# Patient Record
Sex: MALE | Race: WHITE | NOT HISPANIC OR LATINO | Employment: OTHER | URBAN - METROPOLITAN AREA
[De-identification: names, ages, dates, MRNs, and addresses within clinical notes are randomized per-mention and may not be internally consistent; named-entity substitution may affect disease eponyms.]

---

## 2023-02-03 PROBLEM — E78.00 PURE HYPERCHOLESTEROLEMIA: Status: ACTIVE | Noted: 2023-02-03

## 2023-02-03 PROBLEM — E53.8 VITAMIN B12 DEFICIENCY: Status: ACTIVE | Noted: 2023-02-03

## 2023-02-03 PROBLEM — Z12.11 ENCOUNTER FOR SCREENING COLONOSCOPY: Status: ACTIVE | Noted: 2023-02-03

## 2023-02-03 PROBLEM — R19.5 POSITIVE OCCULT STOOL BLOOD TEST: Status: ACTIVE | Noted: 2023-02-03

## 2023-02-03 PROBLEM — G56.22 ULNAR TUNNEL SYNDROME OF LEFT WRIST: Status: ACTIVE | Noted: 2023-02-03

## 2023-02-03 PROBLEM — K76.0 FATTY LIVER DISEASE, NONALCOHOLIC: Status: ACTIVE | Noted: 2023-02-03

## 2023-02-03 PROBLEM — E87.5 HYPERKALEMIA: Status: ACTIVE | Noted: 2023-02-03

## 2023-02-03 PROBLEM — N18.30 TYPE 2 DIABETES MELLITUS WITH STAGE 3 CHRONIC KIDNEY DISEASE, WITH LONG-TERM CURRENT USE OF INSULIN (MULTI): Status: ACTIVE | Noted: 2023-02-03

## 2023-02-03 PROBLEM — I10 HTN (HYPERTENSION), BENIGN: Status: RESOLVED | Noted: 2023-02-03 | Resolved: 2023-02-03

## 2023-02-03 PROBLEM — D64.9 ANEMIA: Status: ACTIVE | Noted: 2023-02-03

## 2023-02-03 PROBLEM — E87.1 HYPONATREMIA: Status: ACTIVE | Noted: 2023-02-03

## 2023-02-03 PROBLEM — E11.22 TYPE 2 DIABETES MELLITUS WITH STAGE 3 CHRONIC KIDNEY DISEASE, WITH LONG-TERM CURRENT USE OF INSULIN (MULTI): Status: ACTIVE | Noted: 2023-02-03

## 2023-02-03 PROBLEM — M54.42 CHRONIC MIDLINE LOW BACK PAIN WITH BILATERAL SCIATICA: Status: ACTIVE | Noted: 2023-02-03

## 2023-02-03 PROBLEM — R53.82 CHRONIC FATIGUE: Status: ACTIVE | Noted: 2023-02-03

## 2023-02-03 PROBLEM — Z79.4 TYPE 2 DIABETES MELLITUS WITH STAGE 3 CHRONIC KIDNEY DISEASE, WITH LONG-TERM CURRENT USE OF INSULIN (MULTI): Status: ACTIVE | Noted: 2023-02-03

## 2023-02-03 PROBLEM — L29.9 ITCH OF SKIN: Status: ACTIVE | Noted: 2023-02-03

## 2023-02-03 PROBLEM — M54.41 CHRONIC MIDLINE LOW BACK PAIN WITH BILATERAL SCIATICA: Status: ACTIVE | Noted: 2023-02-03

## 2023-02-03 PROBLEM — G89.29 CHRONIC NECK PAIN: Status: ACTIVE | Noted: 2023-02-03

## 2023-02-03 PROBLEM — G89.29 CHRONIC MIDLINE LOW BACK PAIN WITH BILATERAL SCIATICA: Status: ACTIVE | Noted: 2023-02-03

## 2023-02-03 PROBLEM — Z23 ENCOUNTER FOR IMMUNIZATION: Status: ACTIVE | Noted: 2023-02-03

## 2023-02-03 PROBLEM — Z12.11 SCREENING FOR COLON CANCER: Status: ACTIVE | Noted: 2023-02-03

## 2023-02-03 PROBLEM — J06.9 URI WITH COUGH AND CONGESTION: Status: ACTIVE | Noted: 2023-02-03

## 2023-02-03 PROBLEM — M54.2 CHRONIC NECK PAIN: Status: ACTIVE | Noted: 2023-02-03

## 2023-02-03 PROBLEM — F17.210 NICOTINE DEPENDENCE, CIGARETTES, UNCOMPLICATED: Status: ACTIVE | Noted: 2023-02-03

## 2023-02-03 PROBLEM — R09.81 CONGESTION OF NASAL SINUS: Status: ACTIVE | Noted: 2023-02-03

## 2023-02-03 PROBLEM — M79.18 MYOFASCIAL PAIN: Status: ACTIVE | Noted: 2023-02-03

## 2023-02-03 PROBLEM — F11.20 OPIOID DEPENDENCE (MULTI): Status: ACTIVE | Noted: 2023-02-03

## 2023-02-03 PROBLEM — R10.11 RIGHT UPPER QUADRANT ABDOMINAL PAIN: Status: ACTIVE | Noted: 2023-02-03

## 2023-02-03 RX ORDER — CARVEDILOL 12.5 MG/1
1 TABLET ORAL 2 TIMES DAILY
COMMUNITY
End: 2024-02-06 | Stop reason: SDUPTHER

## 2023-02-03 RX ORDER — TRIAMCINOLONE ACETONIDE 1 MG/G
OINTMENT TOPICAL
COMMUNITY
Start: 2019-04-30 | End: 2023-11-06 | Stop reason: SDUPTHER

## 2023-02-03 RX ORDER — NALOXONE HYDROCHLORIDE 4 MG/.1ML
4 SPRAY NASAL
COMMUNITY
Start: 2019-05-17

## 2023-02-03 RX ORDER — GLIMEPIRIDE 4 MG/1
1 TABLET ORAL 2 TIMES DAILY
COMMUNITY
Start: 2019-05-17 | End: 2023-11-06 | Stop reason: SDUPTHER

## 2023-02-03 RX ORDER — ATORVASTATIN CALCIUM 80 MG/1
1 TABLET, FILM COATED ORAL DAILY
COMMUNITY
Start: 2019-07-05 | End: 2024-04-16 | Stop reason: SDUPTHER

## 2023-02-03 RX ORDER — LANCETS
1 EACH MISCELLANEOUS 3 TIMES DAILY
COMMUNITY

## 2023-02-03 RX ORDER — BUPRENORPHINE AND NALOXONE 8; 2 MG/1; MG/1
1.5 FILM, SOLUBLE BUCCAL; SUBLINGUAL DAILY
COMMUNITY
Start: 2019-05-16

## 2023-02-03 RX ORDER — INSULIN GLARGINE 100 [IU]/ML
40 INJECTION, SOLUTION SUBCUTANEOUS
COMMUNITY
Start: 2020-06-17 | End: 2023-04-24 | Stop reason: SDUPTHER

## 2023-02-03 RX ORDER — EZETIMIBE 10 MG/1
1 TABLET ORAL DAILY
COMMUNITY
Start: 2022-04-25 | End: 2024-02-06 | Stop reason: SDUPTHER

## 2023-02-03 RX ORDER — BLOOD SUGAR DIAGNOSTIC
1 STRIP MISCELLANEOUS
COMMUNITY
End: 2023-04-04 | Stop reason: SDUPTHER

## 2023-02-03 RX ORDER — BLOOD SUGAR DIAGNOSTIC
1 STRIP MISCELLANEOUS 3 TIMES DAILY
COMMUNITY
Start: 2022-04-11 | End: 2023-11-06 | Stop reason: SDUPTHER

## 2023-02-03 RX ORDER — LISINOPRIL 20 MG/1
TABLET ORAL
COMMUNITY
Start: 2021-03-18 | End: 2023-04-04 | Stop reason: SDUPTHER

## 2023-03-30 LAB
ALANINE AMINOTRANSFERASE (SGPT) (U/L) IN SER/PLAS: 16 U/L (ref 10–52)
ALBUMIN (G/DL) IN SER/PLAS: 4 G/DL (ref 3.4–5)
ALKALINE PHOSPHATASE (U/L) IN SER/PLAS: 143 U/L (ref 33–120)
ALPHA-1 FETOPROTEIN (NG/ML) IN SER/PLAS: <4 NG/ML (ref 0–9)
ANION GAP IN SER/PLAS: 12 MMOL/L (ref 10–20)
ASPARTATE AMINOTRANSFERASE (SGOT) (U/L) IN SER/PLAS: 17 U/L (ref 9–39)
BILIRUBIN TOTAL (MG/DL) IN SER/PLAS: 0.5 MG/DL (ref 0–1.2)
CALCIUM (MG/DL) IN SER/PLAS: 8.7 MG/DL (ref 8.6–10.3)
CARBON DIOXIDE, TOTAL (MMOL/L) IN SER/PLAS: 27 MMOL/L (ref 21–32)
CHLORIDE (MMOL/L) IN SER/PLAS: 102 MMOL/L (ref 98–107)
CHOLESTEROL (MG/DL) IN SER/PLAS: 83 MG/DL (ref 0–199)
CHOLESTEROL IN HDL (MG/DL) IN SER/PLAS: 23.2 MG/DL
CHOLESTEROL/HDL RATIO: 3.6
COBALAMIN (VITAMIN B12) (PG/ML) IN SER/PLAS: 428 PG/ML (ref 211–911)
CREATININE (MG/DL) IN SER/PLAS: 2.03 MG/DL (ref 0.5–1.3)
ERYTHROCYTE DISTRIBUTION WIDTH (RATIO) BY AUTOMATED COUNT: 12.8 % (ref 11.5–14.5)
ERYTHROCYTE MEAN CORPUSCULAR HEMOGLOBIN CONCENTRATION (G/DL) BY AUTOMATED: 31.6 G/DL (ref 32–36)
ERYTHROCYTE MEAN CORPUSCULAR VOLUME (FL) BY AUTOMATED COUNT: 92 FL (ref 80–100)
ERYTHROCYTES (10*6/UL) IN BLOOD BY AUTOMATED COUNT: 4.15 X10E12/L (ref 4.5–5.9)
ESTIMATED AVERAGE GLUCOSE FOR HBA1C: 275 MG/DL
GFR MALE: 37 ML/MIN/1.73M2
GLUCOSE (MG/DL) IN SER/PLAS: 144 MG/DL (ref 74–99)
HEMATOCRIT (%) IN BLOOD BY AUTOMATED COUNT: 38.3 % (ref 41–52)
HEMOGLOBIN (G/DL) IN BLOOD: 12.1 G/DL (ref 13.5–17.5)
HEMOGLOBIN A1C/HEMOGLOBIN TOTAL IN BLOOD: 11.2 %
LDL: 31 MG/DL (ref 0–99)
LEUKOCYTES (10*3/UL) IN BLOOD BY AUTOMATED COUNT: 9.2 X10E9/L (ref 4.4–11.3)
PLATELETS (10*3/UL) IN BLOOD AUTOMATED COUNT: 244 X10E9/L (ref 150–450)
POTASSIUM (MMOL/L) IN SER/PLAS: 4.7 MMOL/L (ref 3.5–5.3)
PROTEIN TOTAL: 7.1 G/DL (ref 6.4–8.2)
SODIUM (MMOL/L) IN SER/PLAS: 136 MMOL/L (ref 136–145)
THYROTROPIN (MIU/L) IN SER/PLAS BY DETECTION LIMIT <= 0.05 MIU/L: 1.74 MIU/L (ref 0.44–3.98)
TRIGLYCERIDE (MG/DL) IN SER/PLAS: 144 MG/DL (ref 0–149)
UREA NITROGEN (MG/DL) IN SER/PLAS: 37 MG/DL (ref 6–23)
VLDL: 29 MG/DL (ref 0–40)

## 2023-04-03 PROBLEM — E11.21: Status: ACTIVE | Noted: 2023-04-03

## 2023-04-03 RX ORDER — PEN NEEDLE, DIABETIC 30 GX3/16"
NEEDLE, DISPOSABLE MISCELLANEOUS
COMMUNITY
End: 2024-02-06 | Stop reason: SDUPTHER

## 2023-04-04 ENCOUNTER — OFFICE VISIT (OUTPATIENT)
Dept: PRIMARY CARE | Facility: CLINIC | Age: 58
End: 2023-04-04
Payer: COMMERCIAL

## 2023-04-04 VITALS — WEIGHT: 241 LBS | HEIGHT: 73 IN | BODY MASS INDEX: 31.94 KG/M2

## 2023-04-04 DIAGNOSIS — Z79.4 TYPE 2 DIABETES MELLITUS WITH DIABETIC NEPHROPATHY, WITH LONG-TERM CURRENT USE OF INSULIN (MULTI): ICD-10-CM

## 2023-04-04 DIAGNOSIS — F17.210 NICOTINE DEPENDENCE, CIGARETTES, UNCOMPLICATED: ICD-10-CM

## 2023-04-04 DIAGNOSIS — F11.20 UNCOMPLICATED OPIOID DEPENDENCE (MULTI): ICD-10-CM

## 2023-04-04 DIAGNOSIS — E78.00 PURE HYPERCHOLESTEROLEMIA: ICD-10-CM

## 2023-04-04 DIAGNOSIS — E11.21 TYPE 2 DIABETES MELLITUS WITH DIABETIC NEPHROPATHY, WITH LONG-TERM CURRENT USE OF INSULIN (MULTI): ICD-10-CM

## 2023-04-04 DIAGNOSIS — Z12.11 COLON CANCER SCREENING: ICD-10-CM

## 2023-04-04 DIAGNOSIS — K76.0 FATTY LIVER DISEASE, NONALCOHOLIC: ICD-10-CM

## 2023-04-04 DIAGNOSIS — Z12.2 ENCOUNTER FOR SCREENING FOR LUNG CANCER: ICD-10-CM

## 2023-04-04 DIAGNOSIS — E11.22 TYPE 2 DIABETES MELLITUS WITH STAGE 3B CHRONIC KIDNEY DISEASE, WITH LONG-TERM CURRENT USE OF INSULIN (MULTI): Primary | ICD-10-CM

## 2023-04-04 DIAGNOSIS — N18.32 TYPE 2 DIABETES MELLITUS WITH STAGE 3B CHRONIC KIDNEY DISEASE, WITH LONG-TERM CURRENT USE OF INSULIN (MULTI): Primary | ICD-10-CM

## 2023-04-04 DIAGNOSIS — Z87.891 PERSONAL HISTORY OF NICOTINE DEPENDENCE: ICD-10-CM

## 2023-04-04 DIAGNOSIS — I10 PRIMARY HYPERTENSION: ICD-10-CM

## 2023-04-04 DIAGNOSIS — Z79.4 TYPE 2 DIABETES MELLITUS WITH STAGE 3B CHRONIC KIDNEY DISEASE, WITH LONG-TERM CURRENT USE OF INSULIN (MULTI): Primary | ICD-10-CM

## 2023-04-04 PROBLEM — M54.42 CHRONIC MIDLINE LOW BACK PAIN WITH BILATERAL SCIATICA: Status: RESOLVED | Noted: 2023-02-03 | Resolved: 2023-04-04

## 2023-04-04 PROBLEM — R10.11 RIGHT UPPER QUADRANT ABDOMINAL PAIN: Status: RESOLVED | Noted: 2023-02-03 | Resolved: 2023-04-04

## 2023-04-04 PROBLEM — R09.81 CONGESTION OF NASAL SINUS: Status: RESOLVED | Noted: 2023-02-03 | Resolved: 2023-04-04

## 2023-04-04 PROBLEM — J06.9 URI WITH COUGH AND CONGESTION: Status: RESOLVED | Noted: 2023-02-03 | Resolved: 2023-04-04

## 2023-04-04 PROBLEM — G89.29 CHRONIC MIDLINE LOW BACK PAIN WITH BILATERAL SCIATICA: Status: RESOLVED | Noted: 2023-02-03 | Resolved: 2023-04-04

## 2023-04-04 PROBLEM — M54.41 CHRONIC MIDLINE LOW BACK PAIN WITH BILATERAL SCIATICA: Status: RESOLVED | Noted: 2023-02-03 | Resolved: 2023-04-04

## 2023-04-04 PROCEDURE — 4010F ACE/ARB THERAPY RXD/TAKEN: CPT | Performed by: FAMILY MEDICINE

## 2023-04-04 PROCEDURE — 3066F NEPHROPATHY DOC TX: CPT | Performed by: FAMILY MEDICINE

## 2023-04-04 PROCEDURE — 99214 OFFICE O/P EST MOD 30 MIN: CPT | Performed by: FAMILY MEDICINE

## 2023-04-04 PROCEDURE — 99406 BEHAV CHNG SMOKING 3-10 MIN: CPT | Performed by: FAMILY MEDICINE

## 2023-04-04 PROCEDURE — 3046F HEMOGLOBIN A1C LEVEL >9.0%: CPT | Performed by: FAMILY MEDICINE

## 2023-04-04 RX ORDER — DULAGLUTIDE 0.75 MG/.5ML
0.75 INJECTION, SOLUTION SUBCUTANEOUS
Qty: 4 EACH | Refills: 11 | Status: SHIPPED | OUTPATIENT
Start: 2023-04-04 | End: 2023-11-06 | Stop reason: ALTCHOICE

## 2023-04-04 RX ORDER — LISINOPRIL 20 MG/1
20 TABLET ORAL DAILY
Qty: 90 TABLET | Refills: 3 | Status: SHIPPED | OUTPATIENT
Start: 2023-04-04 | End: 2024-04-16 | Stop reason: SDUPTHER

## 2023-04-04 RX ORDER — BLOOD SUGAR DIAGNOSTIC
1 STRIP MISCELLANEOUS
Qty: 100 EACH | Refills: 3 | Status: SHIPPED | OUTPATIENT
Start: 2023-04-04

## 2023-04-04 ASSESSMENT — PATIENT HEALTH QUESTIONNAIRE - PHQ9
2. FEELING DOWN, DEPRESSED OR HOPELESS: NOT AT ALL
1. LITTLE INTEREST OR PLEASURE IN DOING THINGS: NOT AT ALL
SUM OF ALL RESPONSES TO PHQ9 QUESTIONS 1 AND 2: 0

## 2023-04-04 NOTE — ASSESSMENT & PLAN NOTE
Add trulicty. Increase lantus to 55 units at bedtime. Call if FBG is below 80. Pt declined to see an endocrinologist for 2nd opinion.   
Fu with nephrology  
Hyperlipidemia on statin. No GI upset or muscle ache. [facility].  Health diet and regular exercise. Decrease calorie intake to lose wt.  f/u in 3 mos.     
Nicotine dependence counseling: patient  smokes cigarettes.  I discussed with patient that  tobacco addiction increases the risks of COPD (emphysema), heart attack, stroke, Peripheral artery disease, and cancer etc. Treatment options such as behavioral counseling (specialty clinic, smoking cessation program, 1-800-QUIT-NOW) and medications (Zyban, chantix and Nicotine replacement therapy) were  discussed with the patient who is considering to quit. Nicotine withdrawal symptoms such as  increased appetite and weight gain, changes in mood (dysphoria or depression), insomnia, irritability, anxiety, difficulty concentrating and restlessness were discussed with patient. Inform patient that Nicotine withdrawal symptoms peak in the first three days of quitting and subside over three to four weeks. More than 3 minutes' discussion and counseling.    
Non alcoholic fatty liver, recommend HepB and A vaccines. no eoth or otc meds. low fat and low cholesterol intake. regular exercise. decrease calorie intake to lose weight. limit fructose intake. f/u in 3 mos    
Pt denies substance abuse. Fu with addiction specialist  
74.8

## 2023-04-04 NOTE — PROGRESS NOTES
"Subjective   Patient ID: Parth Rodríguez is a 57 y.o. male who presents for Follow-up.    HPI   Patient has been compliant with taking all  current medications. No hypoglycemic episodes. No polydipsia, polyuria or significant weight changes. No lower extremities skin lesion. Stable  BLE numbness and  tingling. No blurry vision. No GI upset  or muscle ache while on statin and zetia for high lipids. No jaundice. Normal appetite. No CP, HA, dizziness, heart palpitation. No claudication or cold LE.  Stable  LE edema. No imbalance or falls. Good mood.     Review of Systems    Objective   Ht 1.854 m (6' 1\")   Wt 109 kg (241 lb)   BMI 31.80 kg/m²     Physical Exam  NAD, well groomed, No sclera icterus. neck: supple, no cervical or axillary lymphadenopathy,  lungs: CTA b/l, heart: RRR, No LE edema, normal pedal pulses, abd: soft, no tenderness, no organomegaly, BS+, decreased sensation  at bilateral feet. No skin lesions at bilateral feet.  Good balance. CNII-XII were grossly intact, good judgment and memory. No depressed mood.    Assessment/Plan   Problem List Items Addressed This Visit          Digestive    Fatty liver disease, nonalcoholic     Non alcoholic fatty liver, recommend HepB and A vaccines. no eoth or otc meds. low fat and low cholesterol intake. regular exercise. decrease calorie intake to lose weight. limit fructose intake. f/u in 3 mos              Endocrine/Metabolic    Type 2 diabetes mellitus with stage 3 chronic kidney disease, with long-term current use of insulin (CMS/HCC) - Primary     Add trulicty. Increase lantus to 55 units at bedtime. Call if FBG is below 80. Pt declined to see an endocrinologist for 2nd opinion.          Relevant Medications    lisinopril 20 mg tablet    pen needle, diabetic 32 gauge x 1/4\" needle    Diabetes mellitus with diabetic nephropathy (CMS/HCC)     Fu with nephrology         Relevant Medications    dulaglutide (Trulicity) 0.75 mg/0.5 mL pen injector       Other    " Nicotine dependence, cigarettes, uncomplicated     Nicotine dependence counseling: patient  smokes cigarettes.  I discussed with patient that  tobacco addiction increases the risks of COPD (emphysema), heart attack, stroke, Peripheral artery disease, and cancer etc. Treatment options such as behavioral counseling (specialty clinic, smoking cessation program, 1-800-QUIT-NOW) and medications (Zyban, chantix and Nicotine replacement therapy) were  discussed with the patient who is considering to quit. Nicotine withdrawal symptoms such as  increased appetite and weight gain, changes in mood (dysphoria or depression), insomnia, irritability, anxiety, difficulty concentrating and restlessness were discussed with patient. Inform patient that Nicotine withdrawal symptoms peak in the first three days of quitting and subside over three to four weeks. More than 3 minutes' discussion and counseling.           Opioid dependence (CMS/HCC)     Pt denies substance abuse. Fu with addiction specialist         Pure hypercholesterolemia     Hyperlipidemia on statin. No GI upset or muscle ache. [facility] labs for evaluation.  Health diet and regular exercise. Decrease calorie intake to lose wt.  f/u in 3 mos.             Other Visit Diagnoses       Primary hypertension        Encounter for screening for lung cancer        Relevant Orders    CT lung screening low dose    Colon cancer screening        Relevant Orders    Referral to Colorectal Surgery    Personal history of nicotine dependence        Relevant Orders    CT lung screening low dose

## 2023-04-24 ENCOUNTER — TELEPHONE (OUTPATIENT)
Dept: PRIMARY CARE | Facility: CLINIC | Age: 58
End: 2023-04-24
Payer: COMMERCIAL

## 2023-04-24 DIAGNOSIS — Z79.4 TYPE 2 DIABETES MELLITUS WITH STAGE 3A CHRONIC KIDNEY DISEASE, WITH LONG-TERM CURRENT USE OF INSULIN (MULTI): Primary | ICD-10-CM

## 2023-04-24 DIAGNOSIS — N18.31 TYPE 2 DIABETES MELLITUS WITH STAGE 3A CHRONIC KIDNEY DISEASE, WITH LONG-TERM CURRENT USE OF INSULIN (MULTI): Primary | ICD-10-CM

## 2023-04-24 DIAGNOSIS — E11.22 TYPE 2 DIABETES MELLITUS WITH STAGE 3A CHRONIC KIDNEY DISEASE, WITH LONG-TERM CURRENT USE OF INSULIN (MULTI): Primary | ICD-10-CM

## 2023-04-24 RX ORDER — INSULIN GLARGINE 100 [IU]/ML
55 INJECTION, SOLUTION SUBCUTANEOUS
Qty: 45 ML | Refills: 0 | Status: SHIPPED | OUTPATIENT
Start: 2023-04-24 | End: 2023-11-06 | Stop reason: SDUPTHER

## 2023-05-24 ENCOUNTER — TELEPHONE (OUTPATIENT)
Dept: PRIMARY CARE | Facility: CLINIC | Age: 58
End: 2023-05-24
Payer: COMMERCIAL

## 2023-05-24 NOTE — TELEPHONE ENCOUNTER
Shi with [facility] called and asked that you send in  the following refills for Pt:    insulin glargine (Lantus Solostar U-100 Insulin) 100 unit/mL (3 mL) pen   lisinopril 20 mg tablet   glimepiride (Amaryl) 4 mg tablet   lancets (OneTouch UltraSoft Lancets) misc   ezetimibe (Zetia) 10 mg tablet   atorvastatin (Lipitor) 80 mg tablet       Pharm phone # [phone]

## 2023-07-18 ENCOUNTER — APPOINTMENT (OUTPATIENT)
Dept: PRIMARY CARE | Facility: CLINIC | Age: 58
End: 2023-07-18
Payer: COMMERCIAL

## 2023-11-01 ENCOUNTER — TELEPHONE (OUTPATIENT)
Dept: PRIMARY CARE | Facility: CLINIC | Age: 58
End: 2023-11-01
Payer: COMMERCIAL

## 2023-11-01 NOTE — TELEPHONE ENCOUNTER
Refills on Lantus Solostar 55 units, Glimiperide 10 mg 2 x daily sent to [facility] on [address] in [place]. Rafael has an appt Monday when he gets back in to town but is out of these now and his sugars were 613 today

## 2023-11-06 ENCOUNTER — OFFICE VISIT (OUTPATIENT)
Dept: PRIMARY CARE | Facility: CLINIC | Age: 58
End: 2023-11-06
Payer: COMMERCIAL

## 2023-11-06 VITALS — DIASTOLIC BLOOD PRESSURE: 81 MMHG | SYSTOLIC BLOOD PRESSURE: 129 MMHG | RESPIRATION RATE: 14 BRPM | HEART RATE: 76 BPM

## 2023-11-06 DIAGNOSIS — R21 RASH OF GROIN: ICD-10-CM

## 2023-11-06 DIAGNOSIS — E78.00 PURE HYPERCHOLESTEROLEMIA: ICD-10-CM

## 2023-11-06 DIAGNOSIS — Z12.11 COLON CANCER SCREENING: ICD-10-CM

## 2023-11-06 DIAGNOSIS — F17.210 NICOTINE DEPENDENCE, CIGARETTES, UNCOMPLICATED: ICD-10-CM

## 2023-11-06 DIAGNOSIS — N18.32 TYPE 2 DIABETES MELLITUS WITH STAGE 3B CHRONIC KIDNEY DISEASE, WITH LONG-TERM CURRENT USE OF INSULIN (MULTI): Primary | ICD-10-CM

## 2023-11-06 DIAGNOSIS — Z79.4 TYPE 2 DIABETES MELLITUS WITH STAGE 3B CHRONIC KIDNEY DISEASE, WITH LONG-TERM CURRENT USE OF INSULIN (MULTI): Primary | ICD-10-CM

## 2023-11-06 DIAGNOSIS — E11.22 TYPE 2 DIABETES MELLITUS WITH STAGE 3B CHRONIC KIDNEY DISEASE, WITH LONG-TERM CURRENT USE OF INSULIN (MULTI): Primary | ICD-10-CM

## 2023-11-06 DIAGNOSIS — N25.81 HYPERPARATHYROIDISM DUE TO RENAL INSUFFICIENCY (MULTI): ICD-10-CM

## 2023-11-06 PROBLEM — N18.30 TYPE 2 DIABETES MELLITUS WITH STAGE 3 CHRONIC KIDNEY DISEASE, WITH LONG-TERM CURRENT USE OF INSULIN (MULTI): Status: RESOLVED | Noted: 2023-02-03 | Resolved: 2023-11-06

## 2023-11-06 LAB — POC FINGERSTICK BLOOD GLUCOSE: 280 MG/DL (ref 70–100)

## 2023-11-06 PROCEDURE — 3046F HEMOGLOBIN A1C LEVEL >9.0%: CPT | Performed by: FAMILY MEDICINE

## 2023-11-06 PROCEDURE — 90686 IIV4 VACC NO PRSV 0.5 ML IM: CPT | Performed by: FAMILY MEDICINE

## 2023-11-06 PROCEDURE — 99406 BEHAV CHNG SMOKING 3-10 MIN: CPT | Performed by: FAMILY MEDICINE

## 2023-11-06 PROCEDURE — 4010F ACE/ARB THERAPY RXD/TAKEN: CPT | Performed by: FAMILY MEDICINE

## 2023-11-06 PROCEDURE — 3066F NEPHROPATHY DOC TX: CPT | Performed by: FAMILY MEDICINE

## 2023-11-06 PROCEDURE — 3079F DIAST BP 80-89 MM HG: CPT | Performed by: FAMILY MEDICINE

## 2023-11-06 PROCEDURE — 99214 OFFICE O/P EST MOD 30 MIN: CPT | Performed by: FAMILY MEDICINE

## 2023-11-06 PROCEDURE — 82962 GLUCOSE BLOOD TEST: CPT | Performed by: FAMILY MEDICINE

## 2023-11-06 PROCEDURE — G0008 ADMIN INFLUENZA VIRUS VAC: HCPCS | Performed by: FAMILY MEDICINE

## 2023-11-06 PROCEDURE — 3074F SYST BP LT 130 MM HG: CPT | Performed by: FAMILY MEDICINE

## 2023-11-06 RX ORDER — TRIAMCINOLONE ACETONIDE 1 MG/G
OINTMENT TOPICAL 2 TIMES DAILY
Qty: 20 G | Refills: 2 | Status: SHIPPED | OUTPATIENT
Start: 2023-11-06

## 2023-11-06 RX ORDER — INSULIN GLARGINE 100 [IU]/ML
35 INJECTION, SOLUTION SUBCUTANEOUS
Qty: 45 ML | Refills: 0 | Status: SHIPPED | OUTPATIENT
Start: 2023-11-06 | End: 2023-11-06 | Stop reason: SDUPTHER

## 2023-11-06 RX ORDER — BLOOD SUGAR DIAGNOSTIC
1 STRIP MISCELLANEOUS 4 TIMES DAILY
Qty: 400 STRIP | Refills: 3 | Status: SHIPPED | OUTPATIENT
Start: 2023-11-06

## 2023-11-06 RX ORDER — GLIMEPIRIDE 4 MG/1
4 TABLET ORAL 2 TIMES DAILY
Qty: 180 TABLET | Refills: 3 | Status: SHIPPED | OUTPATIENT
Start: 2023-11-06

## 2023-11-06 RX ORDER — INSULIN GLARGINE 100 [IU]/ML
35 INJECTION, SOLUTION SUBCUTANEOUS
Qty: 45 ML | Refills: 0 | Status: SHIPPED | OUTPATIENT
Start: 2023-11-06 | End: 2024-02-06 | Stop reason: SDUPTHER

## 2023-11-06 NOTE — ASSESSMENT & PLAN NOTE
Hyperlipidemia on zetia and statin. No GI upset or muscle ache. check labs for evaluation.  Health diet and regular exercise. Decrease calorie intake to lose wt.  f/u in 3 mos.

## 2023-11-06 NOTE — ASSESSMENT & PLAN NOTE
DMII, not controlled. Be compliant with taking  current medications and appt. Check A1C, urine albumin. advise eye exam by an OD yearly and checking bilateral feet for skin lesions qhs. Healthy diet and regular exercise. Decrease calorie intake to lose wt.

## 2023-11-06 NOTE — PROGRESS NOTES
Subjective   Patient ID: Parth Rodríguez is a 58 y.o. male who presents for fu    HPI   Patient has not been compliant with appt or taking all  current medications. FBG has been at 180's most days. Random BG was at 600 lately. Pt did not go to ER as recommended. No hypoglycemic episodes. No polydipsia, polyuria or significant weight changes. stable LE numbness and tingling. No blurry vision. No GI upset  or muscle ache while on zetia and statin for high lipids. Normal appetite. No sob, cough, CP, HA, dizziness, heart palpitation. No claudication or cold LE.  Mild  LE edema. No  falls. Good mood. No confusion. Rashes at groin area was controlled with triamcinolone cream prn. Pt has not had derm eval yet.    Review of Systems    Objective   /81   Pulse 76   Resp 14     Physical Exam  NAD, well groomed, No sclera icterus. neck: supple, no cervical or axillary lymphadenopathy,  lungs: CTA b/l, heart: RRR, mild LE edema, normal pedal pulses, abd: soft, no tenderness, BS+, normal strength but decreased  sensation  at bilateral lower extremities. No skin lesions at bilateral feet.   No rashes at groin area. Good balance. CNII-XII were grossly intact, good judgment and memory. No depressed mood.    Assessment/Plan        Patient is not sick today. Patient is not anxious about getting a shot today. Patient has never had Guillain Barré syndrome. Patient has never felt dizzy or faint before, during, or after a shot. Patient has never had a serious reaction to influenza vaccine in the past.  Patient has never had an allergy to an ingredient of influenza vaccine.  Flu shot was given via IM today.   Nicotine dependence counseling: patient  smokes cigarettes.  I discussed with patient that  tobacco addiction increases the risks of COPD (emphysema), heart attack, stroke, Peripheral artery disease, and cancer etc. Treatment options such as behavioral counseling (specialty clinic, smoking cessation program, 1-800-QUIT-NOW)  and medications (Zyban, chantix and Nicotine replacement therapy) were  discussed with the patient who is considering to quit. Nicotine withdrawal symptoms such as  increased appetite and weight gain, changes in mood (dysphoria or depression), insomnia, irritability, anxiety, difficulty concentrating and restlessness were discussed with patient. Inform patient that Nicotine withdrawal symptoms peak in the first three days of quitting and subside over three to four weeks. More than 3 minutes' discussion and counseling.

## 2024-01-10 ENCOUNTER — LAB (OUTPATIENT)
Dept: LAB | Facility: LAB | Age: 59
End: 2024-01-10
Payer: COMMERCIAL

## 2024-01-10 DIAGNOSIS — N18.32 TYPE 2 DIABETES MELLITUS WITH STAGE 3B CHRONIC KIDNEY DISEASE, WITH LONG-TERM CURRENT USE OF INSULIN (MULTI): ICD-10-CM

## 2024-01-10 DIAGNOSIS — N25.81 HYPERPARATHYROIDISM DUE TO RENAL INSUFFICIENCY (MULTI): ICD-10-CM

## 2024-01-10 DIAGNOSIS — E11.22 TYPE 2 DIABETES MELLITUS WITH STAGE 3B CHRONIC KIDNEY DISEASE, WITH LONG-TERM CURRENT USE OF INSULIN (MULTI): ICD-10-CM

## 2024-01-10 DIAGNOSIS — Z79.4 TYPE 2 DIABETES MELLITUS WITH STAGE 3B CHRONIC KIDNEY DISEASE, WITH LONG-TERM CURRENT USE OF INSULIN (MULTI): ICD-10-CM

## 2024-01-10 DIAGNOSIS — E78.00 PURE HYPERCHOLESTEROLEMIA: ICD-10-CM

## 2024-01-10 LAB
ALBUMIN SERPL BCP-MCNC: 3.8 G/DL (ref 3.4–5)
ALP SERPL-CCNC: 90 U/L (ref 33–136)
ALT SERPL W P-5'-P-CCNC: 14 U/L (ref 10–52)
ANION GAP SERPL CALC-SCNC: 13 MMOL/L (ref 10–20)
AST SERPL W P-5'-P-CCNC: 16 U/L (ref 9–39)
BILIRUB SERPL-MCNC: 0.3 MG/DL (ref 0–1.2)
BUN SERPL-MCNC: 36 MG/DL (ref 6–23)
CALCIUM SERPL-MCNC: 8.7 MG/DL (ref 8.6–10.3)
CHLORIDE SERPL-SCNC: 107 MMOL/L (ref 98–107)
CHOLEST SERPL-MCNC: 108 MG/DL (ref 0–199)
CHOLESTEROL/HDL RATIO: 3.5
CO2 SERPL-SCNC: 24 MMOL/L (ref 21–32)
CREAT SERPL-MCNC: 1.97 MG/DL (ref 0.5–1.3)
CREAT UR-MCNC: 140.8 MG/DL (ref 20–370)
EGFRCR SERPLBLD CKD-EPI 2021: 38 ML/MIN/1.73M*2
ERYTHROCYTE [DISTWIDTH] IN BLOOD BY AUTOMATED COUNT: 13.3 % (ref 11.5–14.5)
EST. AVERAGE GLUCOSE BLD GHB EST-MCNC: 318 MG/DL
GLUCOSE SERPL-MCNC: 135 MG/DL (ref 74–99)
HBA1C MFR BLD: 12.7 %
HCT VFR BLD AUTO: 39.1 % (ref 41–52)
HDLC SERPL-MCNC: 31.3 MG/DL
HGB BLD-MCNC: 12.3 G/DL (ref 13.5–17.5)
LDLC SERPL CALC-MCNC: 42 MG/DL
MCH RBC QN AUTO: 29.8 PG (ref 26–34)
MCHC RBC AUTO-ENTMCNC: 31.5 G/DL (ref 32–36)
MCV RBC AUTO: 95 FL (ref 80–100)
MICROALBUMIN UR-MCNC: 303.4 MG/L
MICROALBUMIN/CREAT UR: 215.5 UG/MG CREAT
NON HDL CHOLESTEROL: 77 MG/DL (ref 0–149)
NRBC BLD-RTO: 0 /100 WBCS (ref 0–0)
PLATELET # BLD AUTO: 279 X10*3/UL (ref 150–450)
POTASSIUM SERPL-SCNC: 4.8 MMOL/L (ref 3.5–5.3)
PROT SERPL-MCNC: 6.6 G/DL (ref 6.4–8.2)
PTH-INTACT SERPL-MCNC: 212.3 PG/ML (ref 18.5–88)
RBC # BLD AUTO: 4.13 X10*6/UL (ref 4.5–5.9)
SODIUM SERPL-SCNC: 139 MMOL/L (ref 136–145)
TRIGL SERPL-MCNC: 173 MG/DL (ref 0–149)
VLDL: 35 MG/DL (ref 0–40)
WBC # BLD AUTO: 10.4 X10*3/UL (ref 4.4–11.3)

## 2024-01-10 PROCEDURE — 82570 ASSAY OF URINE CREATININE: CPT

## 2024-01-10 PROCEDURE — 36415 COLL VENOUS BLD VENIPUNCTURE: CPT

## 2024-01-10 PROCEDURE — 80053 COMPREHEN METABOLIC PANEL: CPT

## 2024-01-10 PROCEDURE — 82043 UR ALBUMIN QUANTITATIVE: CPT

## 2024-01-10 PROCEDURE — 83036 HEMOGLOBIN GLYCOSYLATED A1C: CPT

## 2024-01-10 PROCEDURE — 83970 ASSAY OF PARATHORMONE: CPT

## 2024-01-10 PROCEDURE — 80061 LIPID PANEL: CPT

## 2024-01-10 PROCEDURE — 85027 COMPLETE CBC AUTOMATED: CPT

## 2024-02-06 ENCOUNTER — OFFICE VISIT (OUTPATIENT)
Dept: PRIMARY CARE | Facility: CLINIC | Age: 59
End: 2024-02-06
Payer: COMMERCIAL

## 2024-02-06 VITALS
SYSTOLIC BLOOD PRESSURE: 127 MMHG | HEIGHT: 73 IN | DIASTOLIC BLOOD PRESSURE: 78 MMHG | RESPIRATION RATE: 14 BRPM | BODY MASS INDEX: 29.16 KG/M2 | WEIGHT: 220 LBS | HEART RATE: 76 BPM

## 2024-02-06 DIAGNOSIS — Z79.4 TYPE 2 DIABETES MELLITUS WITH STAGE 3B CHRONIC KIDNEY DISEASE, WITH LONG-TERM CURRENT USE OF INSULIN (MULTI): ICD-10-CM

## 2024-02-06 DIAGNOSIS — E11.22 TYPE 2 DIABETES MELLITUS WITH STAGE 3B CHRONIC KIDNEY DISEASE, WITH LONG-TERM CURRENT USE OF INSULIN (MULTI): ICD-10-CM

## 2024-02-06 DIAGNOSIS — L97.519 ULCER OF RIGHT FOOT, UNSPECIFIED ULCER STAGE (MULTI): Primary | ICD-10-CM

## 2024-02-06 DIAGNOSIS — F11.20 UNCOMPLICATED OPIOID DEPENDENCE (MULTI): ICD-10-CM

## 2024-02-06 DIAGNOSIS — Z12.11 COLON CANCER SCREENING: ICD-10-CM

## 2024-02-06 DIAGNOSIS — I10 HTN (HYPERTENSION), BENIGN: ICD-10-CM

## 2024-02-06 DIAGNOSIS — Z00.00 ROUTINE MEDICAL EXAM: ICD-10-CM

## 2024-02-06 DIAGNOSIS — K76.0 FATTY LIVER DISEASE, NONALCOHOLIC: ICD-10-CM

## 2024-02-06 DIAGNOSIS — N18.32 TYPE 2 DIABETES MELLITUS WITH STAGE 3B CHRONIC KIDNEY DISEASE, WITH LONG-TERM CURRENT USE OF INSULIN (MULTI): ICD-10-CM

## 2024-02-06 DIAGNOSIS — E78.00 PURE HYPERCHOLESTEROLEMIA: ICD-10-CM

## 2024-02-06 DIAGNOSIS — N25.81 HYPERPARATHYROIDISM DUE TO RENAL INSUFFICIENCY (MULTI): ICD-10-CM

## 2024-02-06 PROBLEM — E87.1 HYPONATREMIA: Status: RESOLVED | Noted: 2023-02-03 | Resolved: 2024-02-06

## 2024-02-06 PROBLEM — E87.5 HYPERKALEMIA: Status: RESOLVED | Noted: 2023-02-03 | Resolved: 2024-02-06

## 2024-02-06 PROCEDURE — 3074F SYST BP LT 130 MM HG: CPT | Performed by: FAMILY MEDICINE

## 2024-02-06 PROCEDURE — G0439 PPPS, SUBSEQ VISIT: HCPCS | Performed by: FAMILY MEDICINE

## 2024-02-06 PROCEDURE — 3048F LDL-C <100 MG/DL: CPT | Performed by: FAMILY MEDICINE

## 2024-02-06 PROCEDURE — 99214 OFFICE O/P EST MOD 30 MIN: CPT | Performed by: FAMILY MEDICINE

## 2024-02-06 PROCEDURE — 4010F ACE/ARB THERAPY RXD/TAKEN: CPT | Performed by: FAMILY MEDICINE

## 2024-02-06 PROCEDURE — 3078F DIAST BP <80 MM HG: CPT | Performed by: FAMILY MEDICINE

## 2024-02-06 PROCEDURE — 3046F HEMOGLOBIN A1C LEVEL >9.0%: CPT | Performed by: FAMILY MEDICINE

## 2024-02-06 PROCEDURE — 3062F POS MACROALBUMINURIA REV: CPT | Performed by: FAMILY MEDICINE

## 2024-02-06 RX ORDER — PEN NEEDLE, DIABETIC 30 GX3/16"
1 NEEDLE, DISPOSABLE MISCELLANEOUS DAILY
Qty: 100 EACH | Refills: 3 | Status: SHIPPED | OUTPATIENT
Start: 2024-02-06

## 2024-02-06 RX ORDER — INSULIN GLARGINE 100 [IU]/ML
35 INJECTION, SOLUTION SUBCUTANEOUS
Qty: 45 ML | Refills: 0 | Status: SHIPPED | OUTPATIENT
Start: 2024-02-06 | End: 2024-02-06 | Stop reason: SDUPTHER

## 2024-02-06 RX ORDER — EZETIMIBE 10 MG/1
10 TABLET ORAL DAILY
Qty: 90 TABLET | Refills: 3 | Status: SHIPPED | OUTPATIENT
Start: 2024-02-06

## 2024-02-06 RX ORDER — CARVEDILOL 12.5 MG/1
12.5 TABLET ORAL 2 TIMES DAILY
Qty: 180 TABLET | Refills: 3 | Status: SHIPPED | OUTPATIENT
Start: 2024-02-06

## 2024-02-06 RX ORDER — INSULIN GLARGINE 100 [IU]/ML
50 INJECTION, SOLUTION SUBCUTANEOUS
Qty: 45 ML | Refills: 3 | Status: SHIPPED | OUTPATIENT
Start: 2024-02-06

## 2024-02-06 ASSESSMENT — PATIENT HEALTH QUESTIONNAIRE - PHQ9
2. FEELING DOWN, DEPRESSED OR HOPELESS: NOT AT ALL
SUM OF ALL RESPONSES TO PHQ9 QUESTIONS 1 AND 2: 0
1. LITTLE INTEREST OR PLEASURE IN DOING THINGS: NOT AT ALL

## 2024-02-06 ASSESSMENT — ACTIVITIES OF DAILY LIVING (ADL)
MANAGING_FINANCES: INDEPENDENT
TAKING_MEDICATION: INDEPENDENT
DRESSING: INDEPENDENT
BATHING: INDEPENDENT
DOING_HOUSEWORK: INDEPENDENT
GROCERY_SHOPPING: INDEPENDENT

## 2024-02-06 NOTE — PROGRESS NOTES
"Subjective   Patient ID: Parth Rodríguez is a 58 y.o. male who presents for Medicare Annual Wellness Visit Subsequent (fu).    HPI   Patient has been compliant with taking all  current medications. FBG has been at 200's most days. No hypoglycemic episodes. No polydipsia, polyuria or significant weight changes. + rt foot ulcer for 1 year. Pt has not been compliant with wound care fu. + LE numbness and  tingling. No blurry vision. No GI upset  or muscle ache while on statin for high lipids. Normal appetite. No CP, HA, dizziness, heart palpitation. No claudication or cold LE.  No LE edema. No imbalance or falls. Good memory and mood.   Pt stated that current dose of buprenorphine treatment controlled cravings and withdrawals well. Pt denies any opioid abuse.  Review of Systems    Objective   /78   Pulse 76   Resp 14   Ht 1.854 m (6' 1\")   Wt 99.8 kg (220 lb)   BMI 29.03 kg/m²     Physical Exam  NAD, well groomed, No sclera icterus. neck: supple, no thyroid nodules, no cervical or axillary lymphadenopathy,  lungs: CTA b/l, heart: RRR, No LE edema, normal pedal pulses, abd: soft, no tenderness, BS+, normal strength and decreased sensation sensation  at bilateral lower extremities. + ulcer at bottom of rt foot,  Good balance. CNII-XII were grossly intact, good judgment and memory. No depressed mood.    Assessment/Plan     Medicare wellness visit: pt was capable of performing all his ADLs and IADLs. Pt has good memory and cognitive function. pt is overweight. Recommend healthy diet and regular exercise. Advise eye exam by an OD yearly for glaucoma screen and dental exam every 6 months. check lipids.   will monitor blood pressure, cholesterol levels and weight regularly. Recommend shingle vaccines, covid, rsv and hep A/B shots. Recommend pt to clear throw rugs at home and keep good lighting at night to avoid falls. pt stated that he had been taking all current medications as prescribed. recommend setting up " living will and DPOA  DMII, not controlled. Increase lantus to 50 units every day. Add jardiance. Endocrine eval. Pt declined to see a dietitian. Will monitor. A1C, urine albumin. advise eye exam by an OD yearly and checking bilateral feet for skin lesions qhs.   HTN with ckd and hyperthyroidism. will monitor cr level. f/u with nephrology. BP was controlled. Continue BP pills. No NSAIds  fatty liver, DASH diet and regular exercise. Decrease calorie intake to lose wt. recommend hep A shot. avoid fructose. Hepatology eval  Hyperlipidemia on zetia and statin. No GI upset or muscle ache. Will monitor  labs for evaluation. f/u in 3 mos. DC smoking cigarettes.   Opioid dependence, Pt stated that current dose of buprenorphine treatment controlled cravings and withdrawals well. Pt denies any opioid abuse. f/u with Suboxone physician  Rt foot ulcer. Wound clinic eval

## 2024-02-13 ENCOUNTER — OFFICE VISIT (OUTPATIENT)
Dept: WOUND CARE | Facility: CLINIC | Age: 59
End: 2024-02-13
Payer: COMMERCIAL

## 2024-02-13 PROCEDURE — 11042 DBRDMT SUBQ TIS 1ST 20SQCM/<: CPT | Performed by: CLINICAL NURSE SPECIALIST

## 2024-02-13 PROCEDURE — 11042 DBRDMT SUBQ TIS 1ST 20SQCM/<: CPT | Mod: ZK

## 2024-02-13 PROCEDURE — 99204 OFFICE O/P NEW MOD 45 MIN: CPT | Performed by: CLINICAL NURSE SPECIALIST

## 2024-02-13 PROCEDURE — 99213 OFFICE O/P EST LOW 20 MIN: CPT | Mod: 25,27

## 2024-02-20 ENCOUNTER — OFFICE VISIT (OUTPATIENT)
Dept: WOUND CARE | Facility: CLINIC | Age: 59
End: 2024-02-20
Payer: COMMERCIAL

## 2024-02-20 PROCEDURE — 11042 DBRDMT SUBQ TIS 1ST 20SQCM/<: CPT | Mod: ZK

## 2024-02-20 PROCEDURE — 11042 DBRDMT SUBQ TIS 1ST 20SQCM/<: CPT | Performed by: CLINICAL NURSE SPECIALIST

## 2024-02-27 ENCOUNTER — APPOINTMENT (OUTPATIENT)
Dept: WOUND CARE | Facility: CLINIC | Age: 59
End: 2024-02-27
Payer: COMMERCIAL

## 2024-03-05 ENCOUNTER — APPOINTMENT (OUTPATIENT)
Dept: WOUND CARE | Facility: CLINIC | Age: 59
End: 2024-03-05
Payer: COMMERCIAL

## 2024-03-06 ENCOUNTER — APPOINTMENT (OUTPATIENT)
Dept: WOUND CARE | Facility: CLINIC | Age: 59
End: 2024-03-06
Payer: COMMERCIAL

## 2024-03-08 ENCOUNTER — OFFICE VISIT (OUTPATIENT)
Dept: WOUND CARE | Facility: CLINIC | Age: 59
End: 2024-03-08
Payer: COMMERCIAL

## 2024-03-08 PROCEDURE — 11042 DBRDMT SUBQ TIS 1ST 20SQCM/<: CPT | Mod: ZK

## 2024-03-08 PROCEDURE — 11042 DBRDMT SUBQ TIS 1ST 20SQCM/<: CPT | Performed by: CLINICAL NURSE SPECIALIST

## 2024-03-15 ENCOUNTER — OFFICE VISIT (OUTPATIENT)
Dept: WOUND CARE | Facility: CLINIC | Age: 59
End: 2024-03-15
Payer: COMMERCIAL

## 2024-03-15 PROCEDURE — 11042 DBRDMT SUBQ TIS 1ST 20SQCM/<: CPT | Performed by: CLINICAL NURSE SPECIALIST

## 2024-03-15 PROCEDURE — 11042 DBRDMT SUBQ TIS 1ST 20SQCM/<: CPT | Mod: ZK

## 2024-03-29 ENCOUNTER — APPOINTMENT (OUTPATIENT)
Dept: WOUND CARE | Facility: CLINIC | Age: 59
End: 2024-03-29
Payer: COMMERCIAL

## 2024-04-05 ENCOUNTER — OFFICE VISIT (OUTPATIENT)
Dept: WOUND CARE | Facility: CLINIC | Age: 59
End: 2024-04-05
Payer: COMMERCIAL

## 2024-04-05 PROCEDURE — 11042 DBRDMT SUBQ TIS 1ST 20SQCM/<: CPT | Mod: ZK

## 2024-04-05 PROCEDURE — 11042 DBRDMT SUBQ TIS 1ST 20SQCM/<: CPT | Performed by: CLINICAL NURSE SPECIALIST

## 2024-04-12 ENCOUNTER — LAB (OUTPATIENT)
Dept: LAB | Facility: LAB | Age: 59
End: 2024-04-12
Payer: COMMERCIAL

## 2024-04-12 ENCOUNTER — OFFICE VISIT (OUTPATIENT)
Dept: WOUND CARE | Facility: CLINIC | Age: 59
End: 2024-04-12
Payer: COMMERCIAL

## 2024-04-12 DIAGNOSIS — E11.21 TYPE 2 DIABETES MELLITUS WITH DIABETIC NEPHROPATHY (MULTI): ICD-10-CM

## 2024-04-12 DIAGNOSIS — E11.21 TYPE 2 DIABETES MELLITUS WITH DIABETIC NEPHROPATHY (MULTI): Primary | ICD-10-CM

## 2024-04-12 LAB
25(OH)D3 SERPL-MCNC: 32 NG/ML (ref 30–100)
ALBUMIN SERPL BCP-MCNC: 4 G/DL (ref 3.4–5)
ANION GAP SERPL CALC-SCNC: 11 MMOL/L (ref 10–20)
BUN SERPL-MCNC: 43 MG/DL (ref 6–23)
CALCIUM SERPL-MCNC: 8.6 MG/DL (ref 8.6–10.3)
CHLORIDE SERPL-SCNC: 104 MMOL/L (ref 98–107)
CO2 SERPL-SCNC: 27 MMOL/L (ref 21–32)
CREAT SERPL-MCNC: 2.39 MG/DL (ref 0.5–1.3)
CREAT UR-MCNC: 67.4 MG/DL (ref 20–370)
EGFRCR SERPLBLD CKD-EPI 2021: 30 ML/MIN/1.73M*2
ERYTHROCYTE [DISTWIDTH] IN BLOOD BY AUTOMATED COUNT: 13.1 % (ref 11.5–14.5)
GLUCOSE SERPL-MCNC: 165 MG/DL (ref 74–99)
HCT VFR BLD AUTO: 40 % (ref 41–52)
HGB BLD-MCNC: 12.4 G/DL (ref 13.5–17.5)
MCH RBC QN AUTO: 29.8 PG (ref 26–34)
MCHC RBC AUTO-ENTMCNC: 31 G/DL (ref 32–36)
MCV RBC AUTO: 96 FL (ref 80–100)
NRBC BLD-RTO: 0 /100 WBCS (ref 0–0)
PHOSPHATE SERPL-MCNC: 4.3 MG/DL (ref 2.5–4.9)
PLATELET # BLD AUTO: 258 X10*3/UL (ref 150–450)
POTASSIUM SERPL-SCNC: 5.2 MMOL/L (ref 3.5–5.3)
PROT UR-ACNC: 30 MG/DL (ref 5–25)
PROT/CREAT UR: 0.45 MG/MG CREAT (ref 0–0.17)
PTH-INTACT SERPL-MCNC: 235.9 PG/ML (ref 18.5–88)
RBC # BLD AUTO: 4.16 X10*6/UL (ref 4.5–5.9)
SODIUM SERPL-SCNC: 137 MMOL/L (ref 136–145)
WBC # BLD AUTO: 9.7 X10*3/UL (ref 4.4–11.3)

## 2024-04-12 PROCEDURE — 11042 DBRDMT SUBQ TIS 1ST 20SQCM/<: CPT | Performed by: CLINICAL NURSE SPECIALIST

## 2024-04-12 PROCEDURE — 85027 COMPLETE CBC AUTOMATED: CPT

## 2024-04-12 PROCEDURE — 82306 VITAMIN D 25 HYDROXY: CPT

## 2024-04-12 PROCEDURE — 84156 ASSAY OF PROTEIN URINE: CPT

## 2024-04-12 PROCEDURE — 83970 ASSAY OF PARATHORMONE: CPT

## 2024-04-12 PROCEDURE — 80069 RENAL FUNCTION PANEL: CPT

## 2024-04-12 PROCEDURE — 36415 COLL VENOUS BLD VENIPUNCTURE: CPT

## 2024-04-12 PROCEDURE — 11042 DBRDMT SUBQ TIS 1ST 20SQCM/<: CPT | Mod: ZK

## 2024-04-12 PROCEDURE — 82570 ASSAY OF URINE CREATININE: CPT

## 2024-04-16 ENCOUNTER — TELEPHONE (OUTPATIENT)
Dept: PRIMARY CARE | Facility: CLINIC | Age: 59
End: 2024-04-16
Payer: COMMERCIAL

## 2024-04-16 DIAGNOSIS — N18.32 TYPE 2 DIABETES MELLITUS WITH STAGE 3B CHRONIC KIDNEY DISEASE, WITH LONG-TERM CURRENT USE OF INSULIN (MULTI): ICD-10-CM

## 2024-04-16 DIAGNOSIS — E78.00 PURE HYPERCHOLESTEROLEMIA: Primary | ICD-10-CM

## 2024-04-16 DIAGNOSIS — E11.22 TYPE 2 DIABETES MELLITUS WITH STAGE 3B CHRONIC KIDNEY DISEASE, WITH LONG-TERM CURRENT USE OF INSULIN (MULTI): ICD-10-CM

## 2024-04-16 DIAGNOSIS — Z79.4 TYPE 2 DIABETES MELLITUS WITH STAGE 3B CHRONIC KIDNEY DISEASE, WITH LONG-TERM CURRENT USE OF INSULIN (MULTI): ICD-10-CM

## 2024-04-16 RX ORDER — LISINOPRIL 20 MG/1
20 TABLET ORAL DAILY
Qty: 100 TABLET | Refills: 3 | Status: SHIPPED | OUTPATIENT
Start: 2024-04-16

## 2024-04-16 RX ORDER — ATORVASTATIN CALCIUM 80 MG/1
80 TABLET, FILM COATED ORAL DAILY
Qty: 100 TABLET | Refills: 3 | Status: SHIPPED | OUTPATIENT
Start: 2024-04-16

## 2024-04-16 NOTE — TELEPHONE ENCOUNTER
Med Refill    atorvastatin (Lipitor) 80 mg tablet//1 tablet by mouth in the morning    lisinopril 20 mg tablet//1 tablet by 1x daily     [facility] [place]

## 2024-04-19 ENCOUNTER — APPOINTMENT (OUTPATIENT)
Dept: WOUND CARE | Facility: CLINIC | Age: 59
End: 2024-04-19
Payer: COMMERCIAL

## 2024-04-23 ENCOUNTER — APPOINTMENT (OUTPATIENT)
Dept: WOUND CARE | Facility: CLINIC | Age: 59
End: 2024-04-23
Payer: COMMERCIAL

## 2024-05-03 ENCOUNTER — APPOINTMENT (OUTPATIENT)
Dept: PRIMARY CARE | Facility: CLINIC | Age: 59
End: 2024-05-03
Payer: COMMERCIAL

## 2024-05-06 ENCOUNTER — OFFICE VISIT (OUTPATIENT)
Dept: PRIMARY CARE | Facility: CLINIC | Age: 59
End: 2024-05-06
Payer: COMMERCIAL

## 2024-05-06 VITALS — DIASTOLIC BLOOD PRESSURE: 76 MMHG | RESPIRATION RATE: 14 BRPM | SYSTOLIC BLOOD PRESSURE: 130 MMHG | HEART RATE: 76 BPM

## 2024-05-06 DIAGNOSIS — I10 PRIMARY HYPERTENSION: ICD-10-CM

## 2024-05-06 DIAGNOSIS — E78.00 PURE HYPERCHOLESTEROLEMIA: ICD-10-CM

## 2024-05-06 DIAGNOSIS — F17.210 NICOTINE DEPENDENCE, CIGARETTES, UNCOMPLICATED: ICD-10-CM

## 2024-05-06 DIAGNOSIS — Z79.4 TYPE 2 DIABETES MELLITUS WITH STAGE 3B CHRONIC KIDNEY DISEASE, WITH LONG-TERM CURRENT USE OF INSULIN (MULTI): Primary | ICD-10-CM

## 2024-05-06 DIAGNOSIS — N18.32 TYPE 2 DIABETES MELLITUS WITH STAGE 3B CHRONIC KIDNEY DISEASE, WITH LONG-TERM CURRENT USE OF INSULIN (MULTI): Primary | ICD-10-CM

## 2024-05-06 DIAGNOSIS — E11.22 TYPE 2 DIABETES MELLITUS WITH STAGE 3B CHRONIC KIDNEY DISEASE, WITH LONG-TERM CURRENT USE OF INSULIN (MULTI): Primary | ICD-10-CM

## 2024-05-06 PROCEDURE — 3075F SYST BP GE 130 - 139MM HG: CPT | Performed by: FAMILY MEDICINE

## 2024-05-06 PROCEDURE — 3078F DIAST BP <80 MM HG: CPT | Performed by: FAMILY MEDICINE

## 2024-05-06 PROCEDURE — 3060F POS MICROALBUMINURIA REV: CPT | Performed by: FAMILY MEDICINE

## 2024-05-06 PROCEDURE — 3048F LDL-C <100 MG/DL: CPT | Performed by: FAMILY MEDICINE

## 2024-05-06 PROCEDURE — 3046F HEMOGLOBIN A1C LEVEL >9.0%: CPT | Performed by: FAMILY MEDICINE

## 2024-05-06 PROCEDURE — 4010F ACE/ARB THERAPY RXD/TAKEN: CPT | Performed by: FAMILY MEDICINE

## 2024-05-06 PROCEDURE — 99214 OFFICE O/P EST MOD 30 MIN: CPT | Performed by: FAMILY MEDICINE

## 2024-05-06 PROCEDURE — 99406 BEHAV CHNG SMOKING 3-10 MIN: CPT | Performed by: FAMILY MEDICINE

## 2024-05-06 NOTE — ASSESSMENT & PLAN NOTE
Hyperlipidemia on zetia and statin. No GI upset or muscle ache. Will monitor labs for evaluation.  Health diet and regular exercise. Decrease calorie intake to lose wt.  f/u in 3 mos.

## 2024-05-06 NOTE — PROGRESS NOTES
Subjective   Patient ID: Parth Rodríguez is a 58 y.o. male who presents for fu    HPI   Patient has been compliant with taking all  current medications. FBG has been at 120's most days. Occ  hypoglycemic episodes. No polydipsia, polyuria. Stable and improved rt foot skin lesion. stable LE numbness and  tingling. No blurry vision. No GI upset  or muscle ache while on zetia and statin for high lipids. Normal appetite. No nocturia, CP, HA, dizziness, heart palpitation. No claudication or cold LE.  No LE edema. No imbalance or falls. Good mood.     Review of Systems    Objective   /76   Pulse 76   Resp 14     Physical Exam  NAD, well groomed, No sclera icterus.  no cervical or axillary lymphadenopathy,  lungs: CTA b/l, heart: RRR, No LE edema, normal pedal pulses, abd: soft, no tenderness, BS+, normal strength and stable sensation  at bilateral lower extremities. improved skin lesion at rt foot.  Good balance. CNII-XII were grossly intact, good judgment and memory. No depressed mood.    Assessment/Plan   Problem List Items Addressed This Visit             ICD-10-CM    Primary hypertension I10     BP has been controlled. Continue BP pills. keep a daily  bp log and bring in the log at the next office visit. Call office if BP is persistently over 130/80. DASH diet and regular exercise. Decrease calorie intake to lose wt.             Pure hypercholesterolemia E78.00     Hyperlipidemia on zetia and statin. No GI upset or muscle ache. Will monitor labs for evaluation.  Health diet and regular exercise. Decrease calorie intake to lose wt.  f/u in 3 mos.            Relevant Orders    Lipid Panel    Type 2 diabetes mellitus with stage 3b chronic kidney disease, with long-term current use of insulin (Multi) - Primary E11.22, N18.32, Z79.4     DMII, partially controlled. Continue current medications. Will monitor A1C, urine albumin. advise eye exam by an OD yearly and checking bilateral feet for skin lesions qhs. Healthy  diet and regular exercise. Decrease calorie intake to lose wt.             Relevant Orders    Albumin , Urine Random    Hemoglobin A1C    Comprehensive Metabolic Panel     Nicotine dependence counseling: patient  smokes cigarettes.  I discussed with patient that  tobacco addiction increases the risks of COPD (emphysema), heart attack, stroke, Peripheral artery disease, and cancer etc. Treatment options such as behavioral counseling (specialty clinic, smoking cessation program, 1-800-QUIT-NOW) and medications (Zyban, chantix and Nicotine replacement therapy) were  discussed with the patient who is considering to quit. Nicotine withdrawal symptoms such as  increased appetite and weight gain, changes in mood (dysphoria or depression), insomnia, irritability, anxiety, difficulty concentrating and restlessness were discussed with patient. Inform patient that Nicotine withdrawal symptoms peak in the first three days of quitting and subside over three to four weeks. More than 3 minutes' discussion and counseling.

## 2024-05-06 NOTE — ASSESSMENT & PLAN NOTE
DMII, partially controlled. Continue current medications. Will monitor A1C, urine albumin. advise eye exam by an OD yearly and checking bilateral feet for skin lesions qhs. Healthy diet and regular exercise. Decrease calorie intake to lose wt.

## 2024-05-09 ENCOUNTER — APPOINTMENT (OUTPATIENT)
Dept: PRIMARY CARE | Facility: CLINIC | Age: 59
End: 2024-05-09
Payer: COMMERCIAL

## 2024-06-17 LAB — NONINV COLON CA DNA+OCC BLD SCRN STL QL: NEGATIVE

## 2024-08-02 ENCOUNTER — LAB (OUTPATIENT)
Dept: LAB | Facility: LAB | Age: 59
End: 2024-08-02
Payer: COMMERCIAL

## 2024-08-02 DIAGNOSIS — E11.22 TYPE 2 DIABETES MELLITUS WITH STAGE 3B CHRONIC KIDNEY DISEASE, WITH LONG-TERM CURRENT USE OF INSULIN (MULTI): ICD-10-CM

## 2024-08-02 DIAGNOSIS — N18.32 TYPE 2 DIABETES MELLITUS WITH STAGE 3B CHRONIC KIDNEY DISEASE, WITH LONG-TERM CURRENT USE OF INSULIN (MULTI): ICD-10-CM

## 2024-08-02 DIAGNOSIS — E78.00 PURE HYPERCHOLESTEROLEMIA: ICD-10-CM

## 2024-08-02 DIAGNOSIS — Z79.4 TYPE 2 DIABETES MELLITUS WITH STAGE 3B CHRONIC KIDNEY DISEASE, WITH LONG-TERM CURRENT USE OF INSULIN (MULTI): ICD-10-CM

## 2024-08-02 LAB
ALBUMIN SERPL BCP-MCNC: 4.2 G/DL (ref 3.4–5)
ALP SERPL-CCNC: 116 U/L (ref 33–136)
ALT SERPL W P-5'-P-CCNC: 9 U/L (ref 10–52)
ANION GAP SERPL CALC-SCNC: 15 MMOL/L (ref 10–20)
AST SERPL W P-5'-P-CCNC: 14 U/L (ref 9–39)
BILIRUB SERPL-MCNC: 0.3 MG/DL (ref 0–1.2)
BUN SERPL-MCNC: 60 MG/DL (ref 6–23)
CALCIUM SERPL-MCNC: 8.5 MG/DL (ref 8.6–10.3)
CHLORIDE SERPL-SCNC: 105 MMOL/L (ref 98–107)
CHOLEST SERPL-MCNC: 115 MG/DL (ref 0–199)
CHOLESTEROL/HDL RATIO: 3.8
CO2 SERPL-SCNC: 23 MMOL/L (ref 21–32)
CREAT SERPL-MCNC: 2.47 MG/DL (ref 0.5–1.3)
CREAT UR-MCNC: 69.1 MG/DL (ref 20–370)
EGFRCR SERPLBLD CKD-EPI 2021: 29 ML/MIN/1.73M*2
EST. AVERAGE GLUCOSE BLD GHB EST-MCNC: 203 MG/DL
GLUCOSE SERPL-MCNC: 105 MG/DL (ref 74–99)
HBA1C MFR BLD: 8.7 %
HDLC SERPL-MCNC: 30.5 MG/DL
LDLC SERPL CALC-MCNC: 55 MG/DL
MICROALBUMIN UR-MCNC: 80.2 MG/L
MICROALBUMIN/CREAT UR: 116.1 UG/MG CREAT
NON HDL CHOLESTEROL: 85 MG/DL (ref 0–149)
POTASSIUM SERPL-SCNC: 5.5 MMOL/L (ref 3.5–5.3)
PROT SERPL-MCNC: 7.1 G/DL (ref 6.4–8.2)
SODIUM SERPL-SCNC: 137 MMOL/L (ref 136–145)
TRIGL SERPL-MCNC: 148 MG/DL (ref 0–149)
VLDL: 30 MG/DL (ref 0–40)

## 2024-08-02 PROCEDURE — 82570 ASSAY OF URINE CREATININE: CPT

## 2024-08-02 PROCEDURE — 82043 UR ALBUMIN QUANTITATIVE: CPT

## 2024-08-02 PROCEDURE — 80053 COMPREHEN METABOLIC PANEL: CPT

## 2024-08-02 PROCEDURE — 80061 LIPID PANEL: CPT

## 2024-08-02 PROCEDURE — 83036 HEMOGLOBIN GLYCOSYLATED A1C: CPT

## 2024-08-02 PROCEDURE — 36415 COLL VENOUS BLD VENIPUNCTURE: CPT

## 2024-08-06 ENCOUNTER — APPOINTMENT (OUTPATIENT)
Dept: PRIMARY CARE | Facility: CLINIC | Age: 59
End: 2024-08-06
Payer: COMMERCIAL

## 2024-08-06 VITALS — DIASTOLIC BLOOD PRESSURE: 78 MMHG | HEART RATE: 73 BPM | SYSTOLIC BLOOD PRESSURE: 132 MMHG

## 2024-08-06 DIAGNOSIS — Z79.4 TYPE 2 DIABETES MELLITUS WITH STAGE 4 CHRONIC KIDNEY DISEASE, WITH LONG-TERM CURRENT USE OF INSULIN (MULTI): Primary | ICD-10-CM

## 2024-08-06 DIAGNOSIS — E78.00 PURE HYPERCHOLESTEROLEMIA: ICD-10-CM

## 2024-08-06 DIAGNOSIS — R21 RASH OF GROIN: ICD-10-CM

## 2024-08-06 DIAGNOSIS — N18.4 CHRONIC RENAL DISEASE, STAGE IV (MULTI): ICD-10-CM

## 2024-08-06 DIAGNOSIS — E11.22 TYPE 2 DIABETES MELLITUS WITH STAGE 4 CHRONIC KIDNEY DISEASE, WITH LONG-TERM CURRENT USE OF INSULIN (MULTI): Primary | ICD-10-CM

## 2024-08-06 DIAGNOSIS — K76.0 FATTY LIVER DISEASE, NONALCOHOLIC: ICD-10-CM

## 2024-08-06 DIAGNOSIS — N18.4 TYPE 2 DIABETES MELLITUS WITH STAGE 4 CHRONIC KIDNEY DISEASE, WITH LONG-TERM CURRENT USE OF INSULIN (MULTI): Primary | ICD-10-CM

## 2024-08-06 DIAGNOSIS — I10 PRIMARY HYPERTENSION: ICD-10-CM

## 2024-08-06 PROBLEM — L97.519 ULCER OF RIGHT FOOT (MULTI): Status: RESOLVED | Noted: 2024-02-06 | Resolved: 2024-08-06

## 2024-08-06 PROCEDURE — 3075F SYST BP GE 130 - 139MM HG: CPT | Performed by: FAMILY MEDICINE

## 2024-08-06 PROCEDURE — 3048F LDL-C <100 MG/DL: CPT | Performed by: FAMILY MEDICINE

## 2024-08-06 PROCEDURE — 3052F HG A1C>EQUAL 8.0%<EQUAL 9.0%: CPT | Performed by: FAMILY MEDICINE

## 2024-08-06 PROCEDURE — 3060F POS MICROALBUMINURIA REV: CPT | Performed by: FAMILY MEDICINE

## 2024-08-06 PROCEDURE — 99214 OFFICE O/P EST MOD 30 MIN: CPT | Performed by: FAMILY MEDICINE

## 2024-08-06 PROCEDURE — 3078F DIAST BP <80 MM HG: CPT | Performed by: FAMILY MEDICINE

## 2024-08-06 PROCEDURE — 4010F ACE/ARB THERAPY RXD/TAKEN: CPT | Performed by: FAMILY MEDICINE

## 2024-08-06 RX ORDER — TRIAMCINOLONE ACETONIDE 1 MG/G
OINTMENT TOPICAL 2 TIMES DAILY
Qty: 20 G | Refills: 2 | Status: SHIPPED | OUTPATIENT
Start: 2024-08-06

## 2024-08-06 NOTE — PROGRESS NOTES
Subjective   Patient ID: Parth Rodríguez is a 58 y.o. male who presents for fu    HPI   Patient has been compliant with taking all  current medications. FBG has been at 120's some days. Pt had occ  hypoglycemic episodes. No polydipsia, polyuria. No lower extremities skin lesion. No LE numbness or tingling. Pt had oc blurry vision. No GI upset  or muscle ache while on zetia and statin for high lipids. Normal appetite. No CP, HA, dizziness, heart palpitation. No claudication or cold LE.  No LE edema. No imbalance or falls. Good mood. Itchy rashes were controlled with triamcinolone cream prn.    Review of Systems    Objective   /78   Pulse 73     Physical Exam  NAD, well groomed, No sclera icterus. No cervical or axillary lymphadenopathy. lungs: CTA b/l, heart: RRR, No LE edema, normal pedal pulses, abd: soft, no tenderness, BS+, normal strength and sensation  at bilateral lower extremities. No skin lesions at bilateral feet.  No jaundice, Good balance. CNII-XII were grossly intact, good judgment and memory. No depressed mood.    Assessment/Plan   Problem List Items Addressed This Visit             ICD-10-CM    Fatty liver disease, nonalcoholic K76.0     Non alcoholic fatty liver, recommend HepB  vaccine. no eoth or otc meds. low fat and low cholesterol intake. regular exercise. decrease calorie intake to lose weight. limit fructose intake. f/u in 3 mos           Relevant Orders    US abdomen limited liver    Primary hypertension I10     BP has been controlled. Continue BP pills. keep a daily  bp log and bring in the log at the next office visit. Call office if BP is persistently over 130/80. DASH diet and regular exercise. Decrease calorie intake to lose wt.             Pure hypercholesterolemia E78.00     Hyperlipidemia on zetia and statin. No GI upset or muscle ache. Will monitor labs for evaluation.  Health diet and regular exercise. Decrease calorie intake to lose wt.  f/u in 3 mos.            Type 2  diabetes mellitus with stage 4 chronic kidney disease, with long-term current use of insulin (Multi) - Primary E11.22, N18.4, Z79.4     DMII, not  controlled. Increase insulin to 50 units every day. Cont other dmii meds. Will monitor A1C, urine albumin. advise eye exam by an OD yearly and checking bilateral feet for skin lesions qhs. Healthy diet and regular exercise. Decrease calorie intake to lose wt.             Rash of groin R21     Controlled. Cont triamcinolone cream prn         Relevant Medications    triamcinolone (Kenalog) 0.1 % ointment    Chronic renal disease, stage IV (Multi) N18.4     No  lower extremity edema. No NSAIDs, will monitor creatinine level. Fu with nephrologist

## 2024-08-06 NOTE — ASSESSMENT & PLAN NOTE
Non alcoholic fatty liver, recommend HepB  vaccine. no eoth or otc meds. low fat and low cholesterol intake. regular exercise. decrease calorie intake to lose weight. limit fructose intake. f/u in 3 mos

## 2024-08-06 NOTE — ASSESSMENT & PLAN NOTE
DMII, not  controlled. Increase insulin to 50 units every day. Cont other dmii meds. Will monitor A1C, urine albumin. advise eye exam by an OD yearly and checking bilateral feet for skin lesions qhs. Healthy diet and regular exercise. Decrease calorie intake to lose wt.

## 2024-08-11 ENCOUNTER — APPOINTMENT (OUTPATIENT)
Dept: RADIOLOGY | Facility: HOSPITAL | Age: 59
End: 2024-08-11
Payer: COMMERCIAL

## 2024-08-12 ENCOUNTER — APPOINTMENT (OUTPATIENT)
Dept: RADIOLOGY | Facility: CLINIC | Age: 59
End: 2024-08-12
Payer: COMMERCIAL

## 2024-08-19 ENCOUNTER — APPOINTMENT (OUTPATIENT)
Dept: RADIOLOGY | Facility: HOSPITAL | Age: 59
End: 2024-08-19
Payer: COMMERCIAL

## 2024-09-18 ENCOUNTER — HOSPITAL ENCOUNTER (OUTPATIENT)
Dept: RADIOLOGY | Facility: HOSPITAL | Age: 59
Discharge: HOME | End: 2024-09-18
Payer: COMMERCIAL

## 2024-09-18 DIAGNOSIS — K76.0 FATTY LIVER DISEASE, NONALCOHOLIC: ICD-10-CM

## 2024-09-18 PROCEDURE — 76705 ECHO EXAM OF ABDOMEN: CPT

## 2024-09-18 PROCEDURE — 76705 ECHO EXAM OF ABDOMEN: CPT | Performed by: STUDENT IN AN ORGANIZED HEALTH CARE EDUCATION/TRAINING PROGRAM

## 2024-09-19 DIAGNOSIS — R10.9 CHRONIC RIGHT FLANK PAIN: ICD-10-CM

## 2024-09-19 DIAGNOSIS — K76.0 FATTY LIVER DISEASE, NONALCOHOLIC: Primary | ICD-10-CM

## 2024-09-19 DIAGNOSIS — G89.29 CHRONIC RIGHT FLANK PAIN: ICD-10-CM

## 2024-09-20 ENCOUNTER — TELEPHONE (OUTPATIENT)
Dept: PRIMARY CARE | Facility: CLINIC | Age: 59
End: 2024-09-20
Payer: COMMERCIAL

## 2024-09-25 ENCOUNTER — OFFICE VISIT (OUTPATIENT)
Dept: WOUND CARE | Facility: CLINIC | Age: 59
End: 2024-09-25
Payer: COMMERCIAL

## 2024-09-25 DIAGNOSIS — L02.818 CUTANEOUS ABSCESS OF OTHER SITE: Primary | ICD-10-CM

## 2024-09-25 PROCEDURE — 11042 DBRDMT SUBQ TIS 1ST 20SQCM/<: CPT

## 2024-09-25 PROCEDURE — 87077 CULTURE AEROBIC IDENTIFY: CPT | Mod: OUT | Performed by: NURSE PRACTITIONER

## 2024-09-25 PROCEDURE — 99213 OFFICE O/P EST LOW 20 MIN: CPT

## 2024-09-25 RX ORDER — CEPHALEXIN 500 MG/1
500 CAPSULE ORAL 3 TIMES DAILY
Qty: 21 CAPSULE | Refills: 0 | Status: SHIPPED | OUTPATIENT
Start: 2024-09-25 | End: 2024-10-02

## 2024-09-26 ENCOUNTER — LAB REQUISITION (OUTPATIENT)
Dept: LAB | Facility: HOSPITAL | Age: 59
End: 2024-09-26
Payer: COMMERCIAL

## 2024-09-26 DIAGNOSIS — E11.621 TYPE 2 DIABETES MELLITUS WITH FOOT ULCER (CODE): ICD-10-CM

## 2024-09-29 LAB
BACTERIA SPEC CULT: ABNORMAL
BACTERIA SPEC CULT: ABNORMAL
GRAM STN SPEC: ABNORMAL
GRAM STN SPEC: ABNORMAL

## 2024-09-30 ENCOUNTER — APPOINTMENT (OUTPATIENT)
Dept: UROLOGY | Facility: CLINIC | Age: 59
End: 2024-09-30
Payer: COMMERCIAL

## 2024-10-02 ENCOUNTER — OFFICE VISIT (OUTPATIENT)
Dept: WOUND CARE | Facility: CLINIC | Age: 59
End: 2024-10-02
Payer: COMMERCIAL

## 2024-10-02 PROCEDURE — 11042 DBRDMT SUBQ TIS 1ST 20SQCM/<: CPT

## 2024-10-04 ENCOUNTER — APPOINTMENT (OUTPATIENT)
Dept: UROLOGY | Facility: CLINIC | Age: 59
End: 2024-10-04
Payer: COMMERCIAL

## 2024-10-07 ENCOUNTER — APPOINTMENT (OUTPATIENT)
Dept: LAB | Facility: LAB | Age: 59
End: 2024-10-07
Payer: COMMERCIAL

## 2024-10-10 ENCOUNTER — APPOINTMENT (OUTPATIENT)
Dept: WOUND CARE | Facility: CLINIC | Age: 59
End: 2024-10-10
Payer: COMMERCIAL

## 2024-10-15 ENCOUNTER — OFFICE VISIT (OUTPATIENT)
Dept: UROLOGY | Facility: HOSPITAL | Age: 59
End: 2024-10-15
Payer: COMMERCIAL

## 2024-10-15 ENCOUNTER — LAB (OUTPATIENT)
Dept: LAB | Facility: LAB | Age: 59
End: 2024-10-15

## 2024-10-15 DIAGNOSIS — R10.9 CHRONIC RIGHT FLANK PAIN: Primary | ICD-10-CM

## 2024-10-15 DIAGNOSIS — G89.29 CHRONIC RIGHT FLANK PAIN: Primary | ICD-10-CM

## 2024-10-15 DIAGNOSIS — F17.210 NICOTINE DEPENDENCE, CIGARETTES, UNCOMPLICATED: ICD-10-CM

## 2024-10-15 DIAGNOSIS — N28.1 RENAL CYST: ICD-10-CM

## 2024-10-15 DIAGNOSIS — Z12.5 SCREENING PSA (PROSTATE SPECIFIC ANTIGEN): ICD-10-CM

## 2024-10-15 LAB
POC APPEARANCE, URINE: CLEAR
POC BILIRUBIN, URINE: NEGATIVE
POC BLOOD, URINE: NEGATIVE
POC COLOR, URINE: YELLOW
POC GLUCOSE, URINE: ABNORMAL MG/DL
POC KETONES, URINE: NEGATIVE MG/DL
POC LEUKOCYTES, URINE: NEGATIVE
POC NITRITE,URINE: NEGATIVE
POC PH, URINE: 5.5 PH
POC PROTEIN, URINE: NEGATIVE MG/DL
POC SPECIFIC GRAVITY, URINE: 1.01
POC UROBILINOGEN, URINE: 0.2 EU/DL
PSA SERPL-MCNC: 2.13 NG/ML

## 2024-10-15 PROCEDURE — G0103 PSA SCREENING: HCPCS

## 2024-10-15 PROCEDURE — 99407 BEHAV CHNG SMOKING > 10 MIN: CPT | Performed by: NURSE PRACTITIONER

## 2024-10-15 PROCEDURE — 81003 URINALYSIS AUTO W/O SCOPE: CPT | Performed by: NURSE PRACTITIONER

## 2024-10-15 PROCEDURE — 51798 US URINE CAPACITY MEASURE: CPT | Performed by: NURSE PRACTITIONER

## 2024-10-15 PROCEDURE — 99214 OFFICE O/P EST MOD 30 MIN: CPT | Performed by: NURSE PRACTITIONER

## 2024-10-15 RX ORDER — IBUPROFEN 200 MG
1 TABLET ORAL EVERY 24 HOURS
Qty: 30 PATCH | Refills: 0 | Status: SHIPPED | OUTPATIENT
Start: 2024-10-15 | End: 2024-11-14

## 2024-10-15 ASSESSMENT — PAIN SCALES - GENERAL: PAINLEVEL: 6

## 2024-10-15 NOTE — PATIENT INSTRUCTIONS
Quitting Smoking    Quitting smoking is the most important step you can take to improve your health. We're glad you have set a goal to improve your health.    Quit Smoking Resources    In addition to medications, use the STAR plan to help you successfully quit.   Stick with your quit date!   Tell friends, family, and coworkers your quit date. Request their understanding and support.  Anticipate and prepare for challenges. Some examples are withdrawal symptoms, being around others who smoke, and drinking alcohol.  Remove all tobacco products and paraphernalia from your environment. Make your home and vehicles smoke-free.    Free resources for additional support:  National tobacco quitline: 1-800-QUIT-NOW (1-551.142.3640).  SmokefreeTXT is a free text program to assist you in quitting. Visit https://www.smokefree.gov/smokefreetxt for more information.  Feel free to call your care manager at ([phone]) for additional support.

## 2024-10-15 NOTE — PROGRESS NOTES
[facility]  Outpatient Clinic Note    Patient Name:  Parth Rodríguez  MRN:  [id number]  :  1965    Referring Provider: Marivel Hull MD PhD  Date of Service: 10/15/2024   Visit type: New patient visit     problem list/Chief complaint:  Chronic right flank pain  Right cortical cyst 4.1 cm-ultrasound abdomen on 2024      HISTORY OF PRESENT ILLNESS:  Parth Rodríguez is a 59 y.o. male with past medical history of fatty liver disease, HTN, pure hypercholesterolemia, DM 2, CKD stage IV, who presents for initial Urology visit. I performed a detailed review of the medical chart records lab testing and imaging.  Patient referred to urology by his PCP for chronic right flank pain.  Patient reports bilateral flank pain that varies day to day and is exacerbated with activity such as walking for the past year. Pain is 6-7/10, sharp pain, constant, worse in the morning. He denies dysuria, has urgency and frequency, drinks a lot of water. No hematuria, no fever or chills. Has a strong urinary stream. No family history of prostate cancer, he is a smoker, he has cut back and is interested in quitting. Has tried chantix and got a bad reaction from it. Smoking 10-15 cigarettes a day, he is interested in quiting. No family history of kidney stones.     PAST MEDICAL HISTORY:  Past Medical History:   Diagnosis Date    Abscess of epididymis or testis 2019    Abscess of testis    Cannabis abuse, in remission 2020    History of cannabis abuse    Cannabis use, unspecified, uncomplicated 2019    Marijuana smoker, episodic    Cellulitis of right lower limb 2021    Cellulitis of right lower extremity    Chronic cough 2019    Persistent dry cough    Chronic viral hepatitis C (Multi) 05/15/2019    Chronic hepatitis C without hepatic coma    Chronic viral hepatitis C (Multi) 09/10/2020    Chronic hepatitis C    Contact with and (suspected) exposure to covid-19 2020    Suspected COVID-19  virus infection    Inflammatory disorders of scrotum 03/09/2020    Scrotum, abscess    Non-pressure chronic ulcer of other part of right foot with unspecified severity (Multi) 04/18/2022    Foot ulcer, right    Persistent migraine aura without cerebral infarction, intractable, with status migrainosus 11/30/2020    Intractable persistent migraine aura without cerebral infarction and with status migrainosus    Personal history of other diseases of the circulatory system     History of hypertension    Personal history of other endocrine, nutritional and metabolic disease     History of high cholesterol    Personal history of other infectious and parasitic diseases     History of hepatitis C    Personal history of other infectious and parasitic diseases 06/21/2019    History of viral infection    Personal history of other specified conditions 09/21/2020    History of nausea and vomiting    Personal history of other specified conditions 09/21/2020    History of abdominal pain    Psychophysiologic insomnia 11/27/2019    Chronic insomnia    Rash and other nonspecific skin eruption 06/06/2019    Localized rash       PAST SURGICAL HISTORY:  Past Surgical History:   Procedure Laterality Date    OTHER SURGICAL HISTORY  05/15/2019    Stomach surgery    OTHER SURGICAL HISTORY  09/06/2019    Knee surgery    OTHER SURGICAL HISTORY  09/06/2019    Cardiac catheterization with stent placement    OTHER SURGICAL HISTORY  09/06/2019    Ankle surgery    OTHER SURGICAL HISTORY  09/06/2019    Foot surgery       ALLERGIES:  No Known Allergies    MEDICATIONS:  Current Outpatient Medications   Medication Instructions    atorvastatin (LIPITOR) 80 mg, oral, Daily    buprenorphine-naloxone (Suboxone) 8-2 mg SL film 1.5 Film, sublingual, Daily    carvedilol (COREG) 12.5 mg, oral, 2 times daily    empagliflozin (JARDIANCE) 10 mg, oral, Daily    ezetimibe (ZETIA) 10 mg, oral, Daily    glimepiride (AMARYL) 4 mg, oral, 2 times daily    lancets  "(OneTouch UltraSoft Lancets) misc 1 strip, 3 times daily    Lantus Solostar U-100 Insulin 50 Units, subcutaneous, Daily RT    lisinopril 20 mg, oral, Daily    naloxone (NARCAN) 4 mg, nasal    OneTouch Ultra Test strip 1 strip, Topical, 4 times daily    pen needle, diabetic (BD Ultra-Fine Mini Pen Needle) 31 gauge x 3/16\" needle 1 each, topical (top), Daily, Use one daily with insulin pen    pen needle, diabetic 32 gauge x 1/4\" needle 1 each, subcutaneous, Every Day, WITH INSULIN    triamcinolone (Kenalog) 0.1 % ointment Topical, 2 times daily, APPLY SPARINGLY AND RUB IN WELL TO AFFECTED AREA TWICE DAILY        SOCIAL HISTORY:  Social History     Tobacco Use    Smoking status: Every Day     Current packs/day: 0.25     Average packs/day: 0.3 packs/day for 40.8 years (10.2 ttl pk-yrs)     Types: Cigarettes     Start date: 1984    Smokeless tobacco: Never   Substance Use Topics    Alcohol use: Never    Drug use: Yes     Types: Marijuana        FAMILY HISTORY:  Family History   Problem Relation Name Age of Onset    Hypertension Mother      Hypertension Father      Diabetes type II Other GRANDPARENT         REVIEW OF SYSTEMS:  10-pt ROS reviewed and negative except as mentioned above.    Vital signs:  There were no vitals taken for this visit.    PHYSICAL EXAMINATION:  General: Appears comfortable and in no apparent distress.  Head: Normocephalic, atraumatic  Eyes: Non-injected conjunctiva, sclera clear, no proptosis  Lungs: Breathing is easy, non-labored. Speaking in clear and complete sentences. Normal diaphragmatic movement.  Cardiovascular: no peripheral edema, cyanosis or pallor.   Abdomen: soft, non-distended, non-tender  : Bladder: non tender, not distended  MSK: Ambulatory with steady gait, unassisted  Skin: No visible rashes or lesions  Neurologic: Alert, oriented to person, place, and time  Psychiatric: mood and affect appropriate      IMAGING DATA:   US abdomen limited liver  Narrative: Interpreted By:  " Hong Stearns,   STUDY:  US ABDOMEN LIMITED LIVER;  9/18/2024 12:11 pm      INDICATION:  Signs/Symptoms:fatty liver.      ,K76.0 Fatty (change of) liver, not elsewhere classified      COMPARISON:  None.      ACCESSION NUMBER(S):  [id number]      ORDERING CLINICIAN:  COLT SANTIAGO      TECHNIQUE:  Multiple images of the right upper quadrant were obtained.      FINDINGS:  LIVER:  The liver measures 15.5 cm. Increased background echogenicity. No  gross lesions. Trace perihepatic free fluid.          GALLBLADDER:  The gallbladder is nondistended, and demonstrates no evidence of  gallstones, wall thickening or surrounding fluid. The gallbladder  wall thickness is 0.2 cm. Sonographic Canales's sign is negative.          BILE DUCTS:  No evidence of intra or extrahepatic biliary dilatation is  identified; the common bile duct measures 0.5 cm.      PANCREAS:  The pancreas is poorly visualized due to overlying bowel gas.      RIGHT KIDNEY:  The right kidney measures 12.7 cm in length. The renal cortical  echogenicity and thickness are within normal limit.  No  hydronephrosis or renal calculi are seen. Large cortical cyst  measuring 4.1 cm.      Impression: 1. Increased background hepatic echogenicity likely related to  underlying steatosis.  2. Possible trace perihepatic ascitic fluid noted.      MACRO:  None      Signed by: Hong Stearns 9/19/2024 6:22 PM  Dictation workstation:   [id number]      LABORATORY DATA:    Lab Results   Component Value Date    WBC 9.7 04/12/2024    HGB 12.4 (L) 04/12/2024    HCT 40.0 (L) 04/12/2024    MCV 96 04/12/2024     04/12/2024     Lab Results   Component Value Date    GLUCOSE 105 (H) 08/02/2024    CALCIUM 8.5 (L) 08/02/2024     08/02/2024    K 5.5 (H) 08/02/2024    CO2 23 08/02/2024     08/02/2024    BUN 60 (H) 08/02/2024    CREATININE 2.47 (H) 08/02/2024         ASSESSMENT:  Parth Rodríguez is a 59 y.o. male with chronic right flank pain, cortical cyst, who presents for  initial urology visit.      1.We discussed that simple renal cysts are benign findings and do not require intervention unless they are causing discomfort  -Discussed that a cyst the size of 4.1 cm would not be causing him the type of pain that he is having.     2.Today we discussed PSA as a tool to screen gentleman for prostate cancer. We discussed that many factors can elevate the PSA, and when the PSA is elevated further testing is warranted.     I discussed with the patient that the prostate MRI has a high sensitivity for high-grade prostate cancer lesions but a lower sensitivity for low to intermediate prostate cancer lesions. We discussed that currently MRI is not considered to be a standard of care for prostate cancer screening, therefore self-pay option is available.    3.#Smoking Cessation   -Patient is interested in smoking cessation  -Patient has tried Chantix but stopped due to side effects  -He tried nicotine gum which was helpful but he could not afford to keep buying them  -He is hoping that insurance will cover the cost of nicotine patches  -He is aware to take off patch before getting his MRI scan  -I spent over 15 minutes of the visit counseling the patient in regards to cessation of smoking.      PLAN:  -UA negative for infection, glucose (3+) noted.   -PVR 3 ml  -CT AP without contrast ordered to rule out kidney stones.  -Screening PSA ordered  -Nicoderm patch ordered for smoking cessation, prescription sent to pharmacy  -Follow up after CT scan or sooner if needed    All questions and concerns were addressed. Patient verbalizes understanding and has no other questions at this time.     E&M visit today is associated with current or anticipated ongoing medical care services related to a patient's single, serious condition or a complex condition.    JUWAN Peralta-CNP  [facility]  10/15/2024 12:58 PM

## 2024-10-22 ENCOUNTER — HOSPITAL ENCOUNTER (OUTPATIENT)
Dept: RADIOLOGY | Facility: HOSPITAL | Age: 59
Discharge: HOME | End: 2024-10-22
Payer: COMMERCIAL

## 2024-10-22 DIAGNOSIS — M86.9 OSTEOMYELITIS, UNSPECIFIED: ICD-10-CM

## 2024-10-22 PROCEDURE — 73718 MRI LOWER EXTREMITY W/O DYE: CPT | Mod: RT

## 2024-10-22 PROCEDURE — 73718 MRI LOWER EXTREMITY W/O DYE: CPT | Mod: RIGHT SIDE | Performed by: RADIOLOGY

## 2024-10-30 ENCOUNTER — APPOINTMENT (OUTPATIENT)
Dept: RADIOLOGY | Facility: HOSPITAL | Age: 59
End: 2024-10-30
Payer: COMMERCIAL

## 2024-11-06 ENCOUNTER — APPOINTMENT (OUTPATIENT)
Dept: UROLOGY | Facility: HOSPITAL | Age: 59
End: 2024-11-06
Payer: COMMERCIAL

## 2024-11-06 ENCOUNTER — APPOINTMENT (OUTPATIENT)
Dept: PRIMARY CARE | Facility: CLINIC | Age: 59
End: 2024-11-06
Payer: COMMERCIAL

## 2024-11-06 VITALS — SYSTOLIC BLOOD PRESSURE: 120 MMHG | DIASTOLIC BLOOD PRESSURE: 67 MMHG | HEART RATE: 68 BPM

## 2024-11-06 DIAGNOSIS — D64.9 ANEMIA, UNSPECIFIED TYPE: ICD-10-CM

## 2024-11-06 DIAGNOSIS — N18.4 TYPE 2 DIABETES MELLITUS WITH STAGE 4 CHRONIC KIDNEY DISEASE, WITH LONG-TERM CURRENT USE OF INSULIN (MULTI): Primary | ICD-10-CM

## 2024-11-06 DIAGNOSIS — I10 PRIMARY HYPERTENSION: ICD-10-CM

## 2024-11-06 DIAGNOSIS — Z79.4 TYPE 2 DIABETES MELLITUS WITH STAGE 4 CHRONIC KIDNEY DISEASE, WITH LONG-TERM CURRENT USE OF INSULIN (MULTI): Primary | ICD-10-CM

## 2024-11-06 DIAGNOSIS — E11.22 TYPE 2 DIABETES MELLITUS WITH STAGE 4 CHRONIC KIDNEY DISEASE, WITH LONG-TERM CURRENT USE OF INSULIN (MULTI): Primary | ICD-10-CM

## 2024-11-06 DIAGNOSIS — E78.00 PURE HYPERCHOLESTEROLEMIA: ICD-10-CM

## 2024-11-06 PROCEDURE — 4010F ACE/ARB THERAPY RXD/TAKEN: CPT | Performed by: FAMILY MEDICINE

## 2024-11-06 PROCEDURE — 99214 OFFICE O/P EST MOD 30 MIN: CPT | Performed by: FAMILY MEDICINE

## 2024-11-06 PROCEDURE — 3052F HG A1C>EQUAL 8.0%<EQUAL 9.0%: CPT | Performed by: FAMILY MEDICINE

## 2024-11-06 PROCEDURE — 3074F SYST BP LT 130 MM HG: CPT | Performed by: FAMILY MEDICINE

## 2024-11-06 PROCEDURE — 3060F POS MICROALBUMINURIA REV: CPT | Performed by: FAMILY MEDICINE

## 2024-11-06 PROCEDURE — 3078F DIAST BP <80 MM HG: CPT | Performed by: FAMILY MEDICINE

## 2024-11-06 PROCEDURE — 3048F LDL-C <100 MG/DL: CPT | Performed by: FAMILY MEDICINE

## 2024-11-06 PROCEDURE — G2211 COMPLEX E/M VISIT ADD ON: HCPCS | Performed by: FAMILY MEDICINE

## 2024-11-06 NOTE — ASSESSMENT & PLAN NOTE
DMII, well controlled. Continue current medications. Will monitor A1C, urine albumin. advise eye exam by an OD yearly and checking bilateral feet for skin lesions qhs. Healthy diet and regular exercise. Decrease calorie intake to lose wt.      Orders:    Albumin-Creatinine Ratio, Urine Random; Future    Comprehensive Metabolic Panel; Future    Hemoglobin A1C; Future

## 2024-11-06 NOTE — ASSESSMENT & PLAN NOTE
BP has been controlled. Continue BP pills. keep a daily  bp log and bring in the log at the next office visit. Call office if BP is persistently over 130/80. DASH diet and regular exercise. Decrease calorie intake to lose wt.

## 2024-11-06 NOTE — PROGRESS NOTES
Subjective   Patient ID: Parth Rodríguez is a 59 y.o. male who presents for fu    HPI   Patient has been compliant with taking all  current medications. FBG has been at 110's most days. No hypoglycemic episodes. + polydipsia, polyuria or significant weight changes. No lower extremities skin lesion. stable LE numbness and  tingling. No blurry vision. No GI upset  or muscle ache while on statin  and zetia for high lipids. Normal appetite. No sob, fatigue, CP, HA, dizziness, heart palpitation. No claudication or cold LE.  No LE edema. No imbalance or falls. Good mood.     Review of Systems    Objective   /67   Pulse 68     Physical Exam  NAD, well groomed, No sclera icterus. No pale conjunctiva,   lungs: CTA b/l, heart: RRR,  ghazala refill was less than 2 secs, No LE edema, normal pedal pulses, abd: soft, no tenderness, BS+, normal strength and sensation  at bilateral lower extremities.  Good balance. CNII-XII were grossly intact, good judgment and memory. No depressed mood.    Assessment/Plan   Assessment & Plan  Type 2 diabetes mellitus with stage 4 chronic kidney disease, with long-term current use of insulin (Multi)  DMII, well controlled. Continue current medications. Will monitor A1C, urine albumin. advise eye exam by an OD yearly and checking bilateral feet for skin lesions qhs. Healthy diet and regular exercise. Decrease calorie intake to lose wt.      Orders:    Albumin-Creatinine Ratio, Urine Random; Future    Comprehensive Metabolic Panel; Future    Hemoglobin A1C; Future    Pure hypercholesterolemia  Hyperlipidemia on zetia and statin. No GI upset or muscle ache. Will monitor labs for evaluation.  Health diet and regular exercise. Decrease calorie intake to lose wt.  f/u in 3 mos.          Primary hypertension  BP has been controlled. Continue BP pills. keep a daily  bp log and bring in the log at the next office visit. Call office if BP is persistently over 130/80. DASH diet and regular exercise.  Decrease calorie intake to lose wt.           Anemia, unspecified type  Check labs for eval  Orders:    CBC; Future    Vitamin B12; Future    Iron and TIBC; Future    Ferritin; Future    Folate; Future      Dc smoking. Fu with nephro

## 2024-11-06 NOTE — PROGRESS NOTES
[facility]  Outpatient Clinic Note    Patient Name:  Parth Rodríguez  MRN:  [id number]  :  1965  Date of Service: 2024     Visit type: Follow up visit    HPI    Interval History:  Parth Rodríguez is a 59 y.o. male who is being seen today for  problems listed below.     Problem list/Chief complaints:  Chronic right flank pain  Right cortical cyst 4.1 cm-ultrasound abdomen on 2024      10/15/24: Patient referred to urology by his PCP for chronic right flank pain.  Patient reports bilateral flank pain that varies day to day and is exacerbated with activity such as walking for the past year. Pain is 6-7/10, sharp pain, constant, worse in the morning. He denies dysuria, has urgency and frequency, drinks a lot of water. No hematuria, no fever or chills. Has a strong urinary stream. No family history of prostate cancer, he is a smoker, he has cut back and is interested in quitting. Has tried chantix and got a bad reaction from it. Smoking 10-15 cigarettes a day, he is interested in quiting. No family history of kidney stones.   UA negative for infection, glucose (3+) noted. PVR 3 ml. CT AP without contrast ordered to rule out kidney stones. Screening PSA ordered. Nicoderm patch ordered for smoking cessation    Past Medical History:   Diagnosis Date    Abscess of epididymis or testis 2019    Abscess of testis    Cannabis abuse, in remission 2020    History of cannabis abuse    Cannabis use, unspecified, uncomplicated 2019    Marijuana smoker, episodic    Cellulitis of right lower limb 2021    Cellulitis of right lower extremity    Chronic cough 2019    Persistent dry cough    Chronic viral hepatitis C (Multi) 05/15/2019    Chronic hepatitis C without hepatic coma    Chronic viral hepatitis C (Multi) 09/10/2020    Chronic hepatitis C    Contact with and (suspected) exposure to covid-19 2020    Suspected COVID-19 virus infection    Inflammatory disorders of  scrotum 03/09/2020    Scrotum, abscess    Non-pressure chronic ulcer of other part of right foot with unspecified severity (Multi) 04/18/2022    Foot ulcer, right    Persistent migraine aura without cerebral infarction, intractable, with status migrainosus 11/30/2020    Intractable persistent migraine aura without cerebral infarction and with status migrainosus    Personal history of other diseases of the circulatory system     History of hypertension    Personal history of other endocrine, nutritional and metabolic disease     History of high cholesterol    Personal history of other infectious and parasitic diseases     History of hepatitis C    Personal history of other infectious and parasitic diseases 06/21/2019    History of viral infection    Personal history of other specified conditions 09/21/2020    History of nausea and vomiting    Personal history of other specified conditions 09/21/2020    History of abdominal pain    Psychophysiologic insomnia 11/27/2019    Chronic insomnia    Rash and other nonspecific skin eruption 06/06/2019    Localized rash       Past Surgical History:   Procedure Laterality Date    OTHER SURGICAL HISTORY  05/15/2019    Stomach surgery    OTHER SURGICAL HISTORY  09/06/2019    Knee surgery    OTHER SURGICAL HISTORY  09/06/2019    Cardiac catheterization with stent placement    OTHER SURGICAL HISTORY  09/06/2019    Ankle surgery    OTHER SURGICAL HISTORY  09/06/2019    Foot surgery       Social History     Socioeconomic History    Marital status:      Spouse name: Not on file    Number of children: Not on file    Years of education: Not on file    Highest education level: Not on file   Occupational History    Not on file   Tobacco Use    Smoking status: Every Day     Current packs/day: 0.25     Average packs/day: 0.2 packs/day for 40.8 years (10.2 ttl pk-yrs)     Types: Cigarettes     Start date: 1984    Smokeless tobacco: Never   Substance and Sexual Activity    Alcohol use:  "Never    Drug use: Yes     Types: Marijuana    Sexual activity: Not on file   Other Topics Concern    Not on file   Social History Narrative    Not on file     Social Drivers of Health     Financial Resource Strain: Not on file   Food Insecurity: Not on file   Transportation Needs: Not on file   Physical Activity: Not on file   Stress: Not on file   Social Connections: Not on file   Intimate Partner Violence: Not on file   Housing Stability: Not on file       No Known Allergies       Current Outpatient Medications:     atorvastatin (Lipitor) 80 mg tablet, Take 1 tablet (80 mg) by mouth once daily., Disp: 100 tablet, Rfl: 3    buprenorphine-naloxone (Suboxone) 8-2 mg SL film, Place 1.5 Film under the tongue in the morning., Disp: , Rfl:     carvedilol (Coreg) 12.5 mg tablet, Take 1 tablet (12.5 mg) by mouth 2 times a day., Disp: 180 tablet, Rfl: 3    empagliflozin (Jardiance) 10 mg, Take 1 tablet (10 mg) by mouth once daily., Disp: 90 tablet, Rfl: 3    ezetimibe (Zetia) 10 mg tablet, Take 1 tablet (10 mg) by mouth once daily., Disp: 90 tablet, Rfl: 3    glimepiride (Amaryl) 4 mg tablet, Take 1 tablet (4 mg) by mouth 2 times a day., Disp: 180 tablet, Rfl: 3    insulin glargine (Lantus Solostar U-100 Insulin) 100 unit/mL (3 mL) pen, Inject 50 Units under the skin once daily., Disp: 45 mL, Rfl: 3    lancets (OneTouch UltraSoft Lancets) misc, 1 strip in the morning, at noon, and at bedtime., Disp: , Rfl:     lisinopril 20 mg tablet, Take 1 tablet (20 mg) by mouth once daily., Disp: 100 tablet, Rfl: 3    naloxone (Narcan) 4 mg/0.1 mL nasal spray, Administer 1 spray (4 mg) into affected nostril(s)., Disp: , Rfl:     nicotine (Nicoderm CQ) 21 mg/24 hr patch, Place 1 patch over 24 hours on the skin once every 24 hours., Disp: 30 patch, Rfl: 0    OneTouch Ultra Test strip, Apply 1 strip topically 4 times a day., Disp: 400 strip, Rfl: 3    pen needle, diabetic (BD Ultra-Fine Mini Pen Needle) 31 gauge x 3/16\" needle, Apply 1 " each topically once daily. Use one daily with insulin pen, Disp: 100 each, Rfl: 3    triamcinolone (Kenalog) 0.1 % ointment, Apply topically 2 times a day. APPLY SPARINGLY AND RUB IN WELL TO AFFECTED AREA TWICE DAILY, Disp: 20 g, Rfl: 2     Review of system:  All other systems have been reviewed and are negative for complaints      Last recorded vitals:  There were no vitals taken for this visit.    Physical Exam:  ***    Imaging  MR foot right wo IV contrast  Narrative: Interpreted By:  Gurvinder De Anda,   STUDY:  MRI of the right forefoot  without contrast dated  10/22/2024.      INDICATION:  Signs/Symptoms:osteomyelitis      COMPARISON:  MRI dated 05/05/2022.      ACCESSION NUMBER(S):  [id number]      ORDERING CLINICIAN:  LETICIA DONOHUE      TECHNIQUE:  Multiplanar multisequence MRI of the   right forefoot was performed  without intravenous gadolinium based contrast.      FINDINGS:  OSSEOUS STRUCTURES AND JOINTS:      No fracture or dislocation is evident.  Overall mild polyarticular  degenerative changes seen in the midfoot with some greater foci of  degenerative changes seen of the medial naviculocuneiform  articulation between the medial and intermediate cuneiform  articulation and at the 2nd tarsometatarsal articulation and at the  4th tarsometatarsal articulation. Mild degenerative changes seen of  the 1st digit metatarsophalangeal joint. No joint effusion is evident.      MUSCLES, TENDONS, AND LIGAMENTS:      Generalized atrophy is seen in the musculature. Visualized ligaments  are grossly unremarkable.      SOFT TISSUES:      There appears to be an ulcer in the plantar forefoot superficial to  the region of the head of the 5th metatarsal. There is surrounding  skin thickening and adjacent reticular stranding in the subcutaneous  tissues.. No definitive underlying focal collection is evident as  seen on this noncontrast exam      Impression: Ulcer with surrounding cellulitis of the plantar lateral  forefoot  superficial to the 5th metatarsal head without aggressive osseous  process evident at this time. MR-radiographic correlation may be  helpful.      Signed by: Gurvinder De Anda 10/22/2024 2:55 PM  Dictation workstation:   [id number]      Labs  Lab on 10/15/2024   Component Date Value    Prostate Specific Antige* 10/15/2024 2.13        Assessment and Plan:  Parth Rodríguez is a 59 y.o. male with history of right renal cyst, chronic right flank pain, who has follow up after CT scan to review results.     Plan:      Follow-up ***, or sooner if needed, to reassess symptoms and for medication refill.    All questions and concerns were addressed. Patient verbalizes understanding and has no other questions at this time.     Some elements copied from my note on 10/15/24, the elements have been updated and all reflect current decision making from today, 11/05/24     JUWAN Peralta-CNP   [facility]  11/05/24 8:53 PM

## 2024-11-06 NOTE — ASSESSMENT & PLAN NOTE
Check labs for eval  Orders:    CBC; Future    Vitamin B12; Future    Iron and TIBC; Future    Ferritin; Future    Folate; Future

## 2024-11-07 ENCOUNTER — OFFICE VISIT (OUTPATIENT)
Dept: GASTROENTEROLOGY | Facility: CLINIC | Age: 59
End: 2024-11-07
Payer: COMMERCIAL

## 2024-11-07 VITALS
WEIGHT: 210 LBS | TEMPERATURE: 97.1 F | HEIGHT: 73 IN | DIASTOLIC BLOOD PRESSURE: 67 MMHG | BODY MASS INDEX: 27.83 KG/M2 | SYSTOLIC BLOOD PRESSURE: 168 MMHG | HEART RATE: 71 BPM

## 2024-11-07 DIAGNOSIS — R79.89 ABNORMAL LIVER FUNCTION TESTS: ICD-10-CM

## 2024-11-07 DIAGNOSIS — K76.0 FATTY LIVER DISEASE, NONALCOHOLIC: ICD-10-CM

## 2024-11-07 PROCEDURE — 3060F POS MICROALBUMINURIA REV: CPT | Performed by: STUDENT IN AN ORGANIZED HEALTH CARE EDUCATION/TRAINING PROGRAM

## 2024-11-07 PROCEDURE — 99214 OFFICE O/P EST MOD 30 MIN: CPT | Performed by: STUDENT IN AN ORGANIZED HEALTH CARE EDUCATION/TRAINING PROGRAM

## 2024-11-07 PROCEDURE — 3048F LDL-C <100 MG/DL: CPT | Performed by: STUDENT IN AN ORGANIZED HEALTH CARE EDUCATION/TRAINING PROGRAM

## 2024-11-07 PROCEDURE — 3077F SYST BP >= 140 MM HG: CPT | Performed by: STUDENT IN AN ORGANIZED HEALTH CARE EDUCATION/TRAINING PROGRAM

## 2024-11-07 PROCEDURE — 3008F BODY MASS INDEX DOCD: CPT | Performed by: STUDENT IN AN ORGANIZED HEALTH CARE EDUCATION/TRAINING PROGRAM

## 2024-11-07 PROCEDURE — 4010F ACE/ARB THERAPY RXD/TAKEN: CPT | Performed by: STUDENT IN AN ORGANIZED HEALTH CARE EDUCATION/TRAINING PROGRAM

## 2024-11-07 PROCEDURE — 99204 OFFICE O/P NEW MOD 45 MIN: CPT | Performed by: STUDENT IN AN ORGANIZED HEALTH CARE EDUCATION/TRAINING PROGRAM

## 2024-11-07 PROCEDURE — 3052F HG A1C>EQUAL 8.0%<EQUAL 9.0%: CPT | Performed by: STUDENT IN AN ORGANIZED HEALTH CARE EDUCATION/TRAINING PROGRAM

## 2024-11-07 PROCEDURE — 3078F DIAST BP <80 MM HG: CPT | Performed by: STUDENT IN AN ORGANIZED HEALTH CARE EDUCATION/TRAINING PROGRAM

## 2024-11-07 RX ORDER — AMOXICILLIN AND CLAVULANATE POTASSIUM 875; 125 MG/1; MG/1
1 TABLET, FILM COATED ORAL
COMMUNITY
Start: 2024-09-05

## 2024-11-07 RX ORDER — CETIRIZINE HYDROCHLORIDE 10 MG/1
TABLET ORAL
COMMUNITY

## 2024-11-07 RX ORDER — BROMPHENIRAMINE MALEATE, PSEUDOEPHEDRINE HYDROCHLORIDE, AND DEXTROMETHORPHAN HYDROBROMIDE 2; 30; 10 MG/5ML; MG/5ML; MG/5ML
SYRUP ORAL
COMMUNITY
Start: 2024-09-13

## 2024-11-07 ASSESSMENT — PAIN SCALES - GENERAL: PAINLEVEL_OUTOF10: 0-NO PAIN

## 2024-11-07 NOTE — PATIENT INSTRUCTIONS
If you have any questions or need assistance, please don't hesitate to contact us.     Our offices are located at [facility].  Please use the numbers below when calling.   Dr. Cline:         Office [phone] Fax: [phone]  Hepatology Nurse Coordinator:         Cordelia PRUETT [phone]     Main Scheduling Number: [phone]   (See below for department name when scheduling)    [x] FIBROSCAN (Department Gastro) Due : First available  Type : FIBROSCAN: Type of liver elastography.  A special ultrasound that measures scarring (fibrosis) and fat (steatosis) in the liver.  The Fibroscan is located at 3 locations:  [facility], [facility]), [facility] PLEASE DO NOT EAT OR DRINK 3 HOURS BEFORE YOUR EXAM   SCHEDULING: PLEASE STOP AT THE  TO SCHEDULE     [x] LABS (Can be done at any  Location)  Due : with your next blood work  Type : WORKUP: (Labs can be done at any  location)  A workup for underlying causes of liver disease has been ordered.  This includes checking to see if you are protected against Hepatitis A & B. If you are not, it is recommended that anyone with chronic liver disease be vaccinated.

## 2024-11-07 NOTE — PROGRESS NOTES
[facility] HEPATOLOGY CLINIC NOTE     History of Present Illness:   Parth Rodríguez is a 59 y.o. male who presents to clinic for evaluation of fatty liver disease.     Patient was seen by his primary care doctor in the summer 2024 who referred him to hepatology for evaluation of fatty liver disease.  He has a history of hypertension, hyperlipidemia, type 2 diabetes mellitus with stage IV CKD, the patient is on insulin.  He also has a history of hyperparathyroidism, renal cyst, history of opioid dependence, vitamin B12 deficiency.    In review of the patient's liver test, he has had a normal AST and ALT since 2019.  However his alkaline phosphatase has been intermittently elevated of 240 since 2021.  His total bilirubin has been normal.  He has had a normal platelet count.    The patient did have an ultrasound on July 2020 that demonstrated fatty infiltration of the liver, There was no biliary duct dilatation, there was no gallstones.  There is no hepatic masses or signs of cirrhosis.  After seeing his PCP, the patient had a repeat ultrasound in August 2024 that demonstrated fatty liver disease, no hepatomegaly, no signs of cirrhosis.    The patient currently feels well today without any nausea, vomiting, abdominal pain, fevers or chills.  He does have a history of daily alcohol use with beer and spirits however his last ring was over 20 years ago.  He quit drinking to improve his health given his history of diabetes.  The patient does have a history of smoking and does consume 1 pack of cigarettes per day.  No family history of liver disease or liver cancers.    In regards to the patient's history of diabetes, his most recent hemoglobin A1c in August was 8.7 and prior to that it was 12.7.  He is currently on insulin.  He reports that his weight has been fluctuating.  His most recent weight today was 210 pounds but he has fluctuated between 190lbs and 220lbs.     The patient was previously seen in  hepatology clinic in 2020 and in addition to the fatty liver disease she was also being evaluated for a positive hepatitis C antibody.  Fortunately his hepatitis C RNA was negative.  It is recommended he have a FibroScan however.  This was not done.    The patient did also have a history of nausea and vomiting and did endorse cannabis use.  The patient does continue to smoke marijuana daily.      Review of Systems  Review of systems otherwise negative.     Social History   reports that he has been smoking cigarettes. He started smoking about 40 years ago. He has a 10.2 pack-year smoking history. He has never used smokeless tobacco. He reports current drug use. Drug: Marijuana. He reports that he does not drink alcohol.     Family History  family history includes Diabetes type II in an other family member; Hypertension in his father and mother.     Allergies  No Known Allergies    Medications  Current Outpatient Medications   Medication Instructions    amoxicillin-pot clavulanate (Augmentin) 875-125 mg tablet 1 tablet, Every 12 hours scheduled (0630,1830)    atorvastatin (LIPITOR) 80 mg, oral, Daily    brompheniramine-pseudoeph-DM 2-30-10 mg/5 mL syrup TAKE 5 MILLILITERS, EVERY 6 HOURS AS NEEDED FOR 10 DAYS    buprenorphine-naloxone (Suboxone) 8-2 mg SL film 1.5 Film, sublingual, Daily    carvedilol (COREG) 12.5 mg, oral, 2 times daily    cetirizine (ZyrTEC) 10 mg tablet TAKE 1 TABLET BY MOUTH EVERY DAY *BUY OTC    empagliflozin (JARDIANCE) 10 mg, oral, Daily    ezetimibe (ZETIA) 10 mg, oral, Daily    glimepiride (AMARYL) 4 mg, oral, 2 times daily    lancets (OneTouch UltraSoft Lancets) misc 1 strip, 3 times daily    Lantus Solostar U-100 Insulin 50 Units, subcutaneous, Daily RT    lisinopril 20 mg, oral, Daily    naloxone (NARCAN) 4 mg, nasal    nicotine (Nicoderm CQ) 21 mg/24 hr patch 1 patch, transdermal, Every 24 hours    OneTouch Ultra Test strip 1 strip, Topical, 4 times daily    pen needle, diabetic (BD  "Ultra-Fine Mini Pen Needle) 31 gauge x 3/16\" needle 1 each, topical (top), Daily, Use one daily with insulin pen    triamcinolone (Kenalog) 0.1 % ointment Topical, 2 times daily, APPLY SPARINGLY AND RUB IN WELL TO AFFECTED AREA TWICE DAILY        Visit Vitals  /67 Comment: left arm   Pulse 71   Temp 36.2 °C (97.1 °F)      Physical Exam  Middle-age man in no acute distress, nontoxic-appearing, well-nourished and well-developed  Extraocular's are intact, there is no conjunctival icterus  He has multiple tattoos over his forearms  Heart and lung exam unremarkable  Abdomen is soft and nontender to palpation, there is normal active bowel sounds  There is no lower extremity edema.    Labs    Lab Results   Component Value Date    WBC 9.7 04/12/2024    HGB 12.4 (L) 04/12/2024    HCT 40.0 (L) 04/12/2024    MCV 96 04/12/2024     04/12/2024     Lab Results   Component Value Date    GLUCOSE 105 (H) 08/02/2024    CALCIUM 8.5 (L) 08/02/2024     08/02/2024    K 5.5 (H) 08/02/2024    CO2 23 08/02/2024     08/02/2024    BUN 60 (H) 08/02/2024    CREATININE 2.47 (H) 08/02/2024     Lab Results   Component Value Date    ALT 9 (L) 08/02/2024    AST 14 08/02/2024    ALKPHOS 116 08/02/2024    BILITOT 0.3 08/02/2024     Lab Results   Component Value Date    INR 0.9 07/18/2020    PROTIME 10.8 07/18/2020     ASSESSMENT AND PLAN:    # Metabolic dysfunction associated steatotic liver disease  Approximate 10% of patients with fatty liver disease presented with an isolated elevated alkaline phosphatase.  The patient does have fatty liver disease seen on imaging and we did discuss lifestyle changes and the need to optimize his metabolic risk factors to reduce fat in his liver, this would include his diabetes, he is currently on insulin which can promote weight gain however his hemoglobin A1c was 12.7% in January 2024.    I have also ordered a workup for chronic liver disease that would include autoimmune hepatitis, " hemochromatosis, alpha-1 antitrypsin deficiency given his history of the elevated alkaline phosphatase.  This could have been related to some bone mineral disease given his history of vitamin D deficiency, fortunately his most recent check shows that it was normal.    I have also ordered a FibroScan given his history of hepatitis C and longstanding fatty liver disease.  I will follow-up with the patient once these test results are available.    Daily marijuana use can contribute to nausea and vomiting and this was discussed in clinic.    Guevara Cline MD  [facility], [facility]

## 2024-11-08 ENCOUNTER — LAB (OUTPATIENT)
Dept: LAB | Facility: LAB | Age: 59
End: 2024-11-08
Payer: COMMERCIAL

## 2024-11-08 DIAGNOSIS — K76.0 FATTY LIVER DISEASE, NONALCOHOLIC: ICD-10-CM

## 2024-11-08 DIAGNOSIS — Z79.4 TYPE 2 DIABETES MELLITUS WITH STAGE 4 CHRONIC KIDNEY DISEASE, WITH LONG-TERM CURRENT USE OF INSULIN (MULTI): ICD-10-CM

## 2024-11-08 DIAGNOSIS — N18.4 TYPE 2 DIABETES MELLITUS WITH STAGE 4 CHRONIC KIDNEY DISEASE, WITH LONG-TERM CURRENT USE OF INSULIN (MULTI): ICD-10-CM

## 2024-11-08 DIAGNOSIS — E11.22 TYPE 2 DIABETES MELLITUS WITH STAGE 4 CHRONIC KIDNEY DISEASE, WITH LONG-TERM CURRENT USE OF INSULIN (MULTI): ICD-10-CM

## 2024-11-08 DIAGNOSIS — R79.89 ABNORMAL LIVER FUNCTION TESTS: ICD-10-CM

## 2024-11-08 DIAGNOSIS — D64.9 ANEMIA, UNSPECIFIED TYPE: ICD-10-CM

## 2024-11-08 LAB
ALBUMIN SERPL BCP-MCNC: 4 G/DL (ref 3.4–5)
ALP SERPL-CCNC: 115 U/L (ref 33–136)
ALT SERPL W P-5'-P-CCNC: 13 U/L (ref 10–52)
ANION GAP SERPL CALC-SCNC: 13 MMOL/L (ref 10–20)
AST SERPL W P-5'-P-CCNC: 18 U/L (ref 9–39)
BASOPHILS # BLD AUTO: 0.1 X10*3/UL (ref 0–0.1)
BASOPHILS NFR BLD AUTO: 1 %
BILIRUB DIRECT SERPL-MCNC: 0 MG/DL (ref 0–0.3)
BILIRUB SERPL-MCNC: 0.4 MG/DL (ref 0–1.2)
BUN SERPL-MCNC: 51 MG/DL (ref 6–23)
CALCIUM SERPL-MCNC: 8.4 MG/DL (ref 8.6–10.3)
CHLORIDE SERPL-SCNC: 102 MMOL/L (ref 98–107)
CO2 SERPL-SCNC: 27 MMOL/L (ref 21–32)
CREAT SERPL-MCNC: 1.89 MG/DL (ref 0.5–1.3)
CREAT UR-MCNC: 57.9 MG/DL (ref 20–370)
EGFRCR SERPLBLD CKD-EPI 2021: 40 ML/MIN/1.73M*2
EOSINOPHIL # BLD AUTO: 0.75 X10*3/UL (ref 0–0.7)
EOSINOPHIL NFR BLD AUTO: 7.6 %
ERYTHROCYTE [DISTWIDTH] IN BLOOD BY AUTOMATED COUNT: 13.2 % (ref 11.5–14.5)
EST. AVERAGE GLUCOSE BLD GHB EST-MCNC: 260 MG/DL
FERRITIN SERPL-MCNC: 58 NG/ML (ref 20–300)
FOLATE SERPL-MCNC: 13.2 NG/ML
GLUCOSE SERPL-MCNC: 197 MG/DL (ref 74–99)
HBA1C MFR BLD: 10.7 %
HBV SURFACE AB SER-ACNC: <3.1 MIU/ML
HBV SURFACE AG SERPL QL IA: NONREACTIVE
HCT VFR BLD AUTO: 40.1 % (ref 41–52)
HGB BLD-MCNC: 12.5 G/DL (ref 13.5–17.5)
IMM GRANULOCYTES # BLD AUTO: 0.03 X10*3/UL (ref 0–0.7)
IMM GRANULOCYTES NFR BLD AUTO: 0.3 % (ref 0–0.9)
INR PPP: 1 (ref 0.9–1.1)
IRON SATN MFR SERPL: 27 % (ref 25–45)
IRON SERPL-MCNC: 82 UG/DL (ref 35–150)
LYMPHOCYTES # BLD AUTO: 2.68 X10*3/UL (ref 1.2–4.8)
LYMPHOCYTES NFR BLD AUTO: 27.3 %
MCH RBC QN AUTO: 29.8 PG (ref 26–34)
MCHC RBC AUTO-ENTMCNC: 31.2 G/DL (ref 32–36)
MCV RBC AUTO: 96 FL (ref 80–100)
MICROALBUMIN UR-MCNC: 91.2 MG/L
MICROALBUMIN/CREAT UR: 157.5 UG/MG CREAT
MONOCYTES # BLD AUTO: 0.97 X10*3/UL (ref 0.1–1)
MONOCYTES NFR BLD AUTO: 9.9 %
NEUTROPHILS # BLD AUTO: 5.3 X10*3/UL (ref 1.2–7.7)
NEUTROPHILS NFR BLD AUTO: 53.9 %
NRBC BLD-RTO: 0 /100 WBCS (ref 0–0)
PLATELET # BLD AUTO: 244 X10*3/UL (ref 150–450)
POTASSIUM SERPL-SCNC: 5.2 MMOL/L (ref 3.5–5.3)
PROT SERPL-MCNC: 6.4 G/DL (ref 6.4–8.2)
PROTHROMBIN TIME: 11.4 SECONDS (ref 9.8–12.8)
RBC # BLD AUTO: 4.19 X10*6/UL (ref 4.5–5.9)
SODIUM SERPL-SCNC: 137 MMOL/L (ref 136–145)
TIBC SERPL-MCNC: 302 UG/DL (ref 240–445)
UIBC SERPL-MCNC: 220 UG/DL (ref 110–370)
VIT B12 SERPL-MCNC: 318 PG/ML (ref 211–911)
WBC # BLD AUTO: 9.8 X10*3/UL (ref 4.4–11.3)

## 2024-11-08 PROCEDURE — 86256 FLUORESCENT ANTIBODY TITER: CPT

## 2024-11-08 PROCEDURE — 82104 ALPHA-1-ANTITRYPSIN PHENO: CPT

## 2024-11-08 PROCEDURE — 80053 COMPREHEN METABOLIC PANEL: CPT

## 2024-11-08 PROCEDURE — 83036 HEMOGLOBIN GLYCOSYLATED A1C: CPT

## 2024-11-08 PROCEDURE — 86706 HEP B SURFACE ANTIBODY: CPT

## 2024-11-08 PROCEDURE — 85610 PROTHROMBIN TIME: CPT

## 2024-11-08 PROCEDURE — 82570 ASSAY OF URINE CREATININE: CPT

## 2024-11-08 PROCEDURE — 85025 COMPLETE CBC W/AUTO DIFF WBC: CPT

## 2024-11-08 PROCEDURE — 82248 BILIRUBIN DIRECT: CPT

## 2024-11-08 PROCEDURE — 83540 ASSAY OF IRON: CPT

## 2024-11-08 PROCEDURE — 86015 ACTIN ANTIBODY EACH: CPT

## 2024-11-08 PROCEDURE — 36415 COLL VENOUS BLD VENIPUNCTURE: CPT

## 2024-11-08 PROCEDURE — 86038 ANTINUCLEAR ANTIBODIES: CPT

## 2024-11-08 PROCEDURE — 86381 MITOCHONDRIAL ANTIBODY EACH: CPT

## 2024-11-08 PROCEDURE — 82607 VITAMIN B-12: CPT

## 2024-11-08 PROCEDURE — 82746 ASSAY OF FOLIC ACID SERUM: CPT

## 2024-11-08 PROCEDURE — 82728 ASSAY OF FERRITIN: CPT

## 2024-11-08 PROCEDURE — 82043 UR ALBUMIN QUANTITATIVE: CPT

## 2024-11-08 PROCEDURE — 83550 IRON BINDING TEST: CPT

## 2024-11-08 PROCEDURE — 87340 HEPATITIS B SURFACE AG IA: CPT

## 2024-11-10 DIAGNOSIS — Z79.4 TYPE 2 DIABETES MELLITUS WITH STAGE 4 CHRONIC KIDNEY DISEASE, WITH LONG-TERM CURRENT USE OF INSULIN (MULTI): Primary | ICD-10-CM

## 2024-11-10 DIAGNOSIS — E11.22 TYPE 2 DIABETES MELLITUS WITH STAGE 4 CHRONIC KIDNEY DISEASE, WITH LONG-TERM CURRENT USE OF INSULIN (MULTI): Primary | ICD-10-CM

## 2024-11-10 DIAGNOSIS — N18.4 TYPE 2 DIABETES MELLITUS WITH STAGE 4 CHRONIC KIDNEY DISEASE, WITH LONG-TERM CURRENT USE OF INSULIN (MULTI): Primary | ICD-10-CM

## 2024-11-11 LAB
ANA SER QL HEP2 SUBST: NEGATIVE
MITOCHONDRIA AB SER QL IF: NEGATIVE

## 2024-11-12 LAB
A1AT PHENOTYP SERPL-IMP: NORMAL
A1AT SERPL-MCNC: 130 MG/DL (ref 90–200)

## 2024-11-13 LAB — SMOOTH MUSCLE AB SER QL IF: ABNORMAL

## 2024-11-14 ENCOUNTER — HOSPITAL ENCOUNTER (OUTPATIENT)
Dept: RADIOLOGY | Facility: HOSPITAL | Age: 59
Discharge: HOME | End: 2024-11-14
Payer: COMMERCIAL

## 2024-11-14 DIAGNOSIS — G89.29 CHRONIC RIGHT FLANK PAIN: ICD-10-CM

## 2024-11-14 DIAGNOSIS — R10.9 CHRONIC RIGHT FLANK PAIN: ICD-10-CM

## 2024-11-14 PROCEDURE — 74176 CT ABD & PELVIS W/O CONTRAST: CPT

## 2024-11-17 NOTE — PROGRESS NOTES
[facility]  Outpatient Clinic Note    Patient Name:  Parth Rodríguez  MRN:  [id number]  :  1965  Date of Service: 2024     Virtual or Telephone Consent    An interactive audio and video telecommunication system which permits real time communications between the patient (at the originating site) and provider (at the distant site) was utilized to provide this telehealth service.   Verbal consent was requested and obtained from Parth Rodríguez on this date, 24 for a telehealth visit.     Visit type: Follow up visit    HPI    Interval History:  Parth Rodríguez is a 59 y.o. male who is being seen today for  problems listed below.     Problem list/Chief complaints:  Right nephrolithiasis  Renal cysts  BPH      10/15/24: Patient referred to urology by his PCP for chronic right flank pain.  Patient reports bilateral flank pain that varies day to day and is exacerbated with activity such as walking for the past year. Pain is 6-7/10, sharp pain, constant, worse in the morning. He denies dysuria, has urgency and frequency, drinks a lot of water. No hematuria, no fever or chills. Has a strong urinary stream. No family history of prostate cancer, he is a smoker, he has cut back and is interested in quitting. Has tried chantix and got a bad reaction from it. Smoking 10-15 cigarettes a day, he is interested in quiting. No family history of kidney stones.   UA negative for infection, glucose (3+) noted. PVR 3 ml. CT AP without contrast ordered to rule out kidney stones. Screening PSA ordered. Nicoderm patch ordered for smoking cessation    24: Patient is being seen virtually for follow up and to review scan results. He does not feel well because he's currently experiencing frequent hypoglycemic episodes and his doctor is titrating his insulin. He still has intermittent right flank pain. He is using nicoderm patches which are helping with smoking cessations but he still does smoke (not with  patch on). He denies hematuria, no fever or chills.    Past Medical History:   Diagnosis Date    Abscess of epididymis or testis 09/13/2019    Abscess of testis    Cannabis abuse, in remission 07/06/2020    History of cannabis abuse    Cannabis use, unspecified, uncomplicated 11/01/2019    Marijuana smoker, episodic    Cellulitis of right lower limb 09/21/2021    Cellulitis of right lower extremity    Chronic cough 06/28/2019    Persistent dry cough    Chronic viral hepatitis C (Multi) 05/15/2019    Chronic hepatitis C without hepatic coma    Chronic viral hepatitis C (Multi) 09/10/2020    Chronic hepatitis C    Contact with and (suspected) exposure to covid-19 06/01/2020    Suspected COVID-19 virus infection    Inflammatory disorders of scrotum 03/09/2020    Scrotum, abscess    Non-pressure chronic ulcer of other part of right foot with unspecified severity (Multi) 04/18/2022    Foot ulcer, right    Persistent migraine aura without cerebral infarction, intractable, with status migrainosus 11/30/2020    Intractable persistent migraine aura without cerebral infarction and with status migrainosus    Personal history of other diseases of the circulatory system     History of hypertension    Personal history of other endocrine, nutritional and metabolic disease     History of high cholesterol    Personal history of other infectious and parasitic diseases     History of hepatitis C    Personal history of other infectious and parasitic diseases 06/21/2019    History of viral infection    Personal history of other specified conditions 09/21/2020    History of nausea and vomiting    Personal history of other specified conditions 09/21/2020    History of abdominal pain    Psychophysiologic insomnia 11/27/2019    Chronic insomnia    Rash and other nonspecific skin eruption 06/06/2019    Localized rash       Past Surgical History:   Procedure Laterality Date    OTHER SURGICAL HISTORY  05/15/2019    Stomach surgery    OTHER  SURGICAL HISTORY  09/06/2019    Knee surgery    OTHER SURGICAL HISTORY  09/06/2019    Cardiac catheterization with stent placement    OTHER SURGICAL HISTORY  09/06/2019    Ankle surgery    OTHER SURGICAL HISTORY  09/06/2019    Foot surgery       Social History     Socioeconomic History    Marital status:      Spouse name: Not on file    Number of children: Not on file    Years of education: Not on file    Highest education level: Not on file   Occupational History    Not on file   Tobacco Use    Smoking status: Every Day     Current packs/day: 0.25     Average packs/day: 0.3 packs/day for 40.9 years (10.2 ttl pk-yrs)     Types: Cigarettes     Start date: 1984    Smokeless tobacco: Never   Substance and Sexual Activity    Alcohol use: Never    Drug use: Yes     Types: Marijuana    Sexual activity: Not on file   Other Topics Concern    Not on file   Social History Narrative    Not on file     Social Drivers of Health     Financial Resource Strain: Not on file   Food Insecurity: Not on file   Transportation Needs: Not on file   Physical Activity: Not on file   Stress: Not on file   Social Connections: Not on file   Intimate Partner Violence: Not on file   Housing Stability: Not on file       No Known Allergies       Current Outpatient Medications:     amoxicillin-pot clavulanate (Augmentin) 875-125 mg tablet, Take 1 tablet by mouth every 12 hours. (Patient not taking: Reported on 11/7/2024), Disp: , Rfl:     atorvastatin (Lipitor) 80 mg tablet, Take 1 tablet (80 mg) by mouth once daily., Disp: 100 tablet, Rfl: 3    brompheniramine-pseudoeph-DM 2-30-10 mg/5 mL syrup, TAKE 5 MILLILITERS, EVERY 6 HOURS AS NEEDED FOR 10 DAYS (Patient not taking: Reported on 11/7/2024), Disp: , Rfl:     buprenorphine-naloxone (Suboxone) 8-2 mg SL film, Place 1.5 Film under the tongue in the morning., Disp: , Rfl:     carvedilol (Coreg) 12.5 mg tablet, Take 1 tablet (12.5 mg) by mouth 2 times a day., Disp: 180 tablet, Rfl: 3     "cetirizine (ZyrTEC) 10 mg tablet, TAKE 1 TABLET BY MOUTH EVERY DAY *BUY OTC, Disp: , Rfl:     empagliflozin (Jardiance) 10 mg, Take 1 tablet (10 mg) by mouth once daily., Disp: 90 tablet, Rfl: 3    ezetimibe (Zetia) 10 mg tablet, Take 1 tablet (10 mg) by mouth once daily., Disp: 90 tablet, Rfl: 3    glimepiride (Amaryl) 4 mg tablet, Take 1 tablet (4 mg) by mouth 2 times a day., Disp: 180 tablet, Rfl: 3    insulin glargine (Lantus Solostar U-100 Insulin) 100 unit/mL (3 mL) pen, Inject 50 Units under the skin once daily., Disp: 45 mL, Rfl: 3    lancets (OneTouch UltraSoft Lancets) misc, 1 strip in the morning, at noon, and at bedtime., Disp: , Rfl:     lisinopril 20 mg tablet, Take 1 tablet (20 mg) by mouth once daily., Disp: 100 tablet, Rfl: 3    naloxone (Narcan) 4 mg/0.1 mL nasal spray, Administer 1 spray (4 mg) into affected nostril(s)., Disp: , Rfl:     nicotine (Nicoderm CQ) 21 mg/24 hr patch, Place 1 patch over 24 hours on the skin once every 24 hours., Disp: 30 patch, Rfl: 0    OneTouch Ultra Test strip, Apply 1 strip topically 4 times a day., Disp: 400 strip, Rfl: 3    pen needle, diabetic (BD Ultra-Fine Mini Pen Needle) 31 gauge x 3/16\" needle, Apply 1 each topically once daily. Use one daily with insulin pen, Disp: 100 each, Rfl: 3    triamcinolone (Kenalog) 0.1 % ointment, Apply topically 2 times a day. APPLY SPARINGLY AND RUB IN WELL TO AFFECTED AREA TWICE DAILY, Disp: 20 g, Rfl: 2     Review of system:  All other systems have been reviewed and are negative for complaints      Last recorded vitals:  There were no vitals taken for this visit.    Physical Exam: Limited due to virtual visit  General: Appears comfortable and in no apparent distress.  Lungs: Breathing is easy, non-labored. Speaking in clear and complete sentences. Normal diaphragmatic movement  Neurologic: Alert, oriented to person, place, and time  Psychiatric: mood and affect appropriate      Imaging  CT abdomen pelvis wo IV " contrast  Narrative: Interpreted By:  Daniel Looney,   STUDY:  CT ABDOMEN PELVIS WO IV CONTRAST;  11/14/2024 2:49 pm      INDICATION:  Signs/Symptoms:bilateral flank pain, kidney disease.      COMPARISON:  None      ACCESSION NUMBER(S):  [id number]      ORDERING CLINICIAN:  ROHIT COTTON      TECHNIQUE:  CT of the abdomen and pelvis was performed. Contiguous axial images  were obtained at 3 mm slice thickness through the abdomen and pelvis.  Coronal and sagittal reconstructions at 3 mm slice thickness were  performed.  No intravenous contrast was administered.      FINDINGS:  Please note that the evaluation of vessels, lymph nodes and organs is  limited without intravenous contrast.      LOWER CHEST:  Mild patchy bibasilar infiltrates and/or atelectasis. Partially  imaged 7 mm right lower lobe nodule image 1. 6-7 mm anterior right  lung nodule image 12. Partially imaged marked coronary  calcifications. Wall thickening of the visualized distal esophagus.      ABDOMEN:      LIVER:  Vague approximate 1.6 cm hypoattenuating lesion at the posterior  right hepatic lobe image 26      BILE DUCTS:  No significant biliary ductal dilatation.      GALLBLADDER:  Distended. Some probable mild dependent stones and/or sludge. Focal  lobulated protrusion appearance likely Phrygian cap at the  gallbladder fundus.      PANCREAS:  Within normal limits.      SPLEEN:  Within normal limits.      ADRENAL GLANDS:  Within normal limits.      KIDNEYS AND URETERS:  A few small right renal stones versus vascular calcifications at the  renal sinus region up to 4 mm. 5 mm lower pole right renal stone.  Approximate 6.6 cm craniocaudal and 3.4 cm axial dimension  low-density lesion at the upper pole right renal sinus region  probably renal sinus cyst although incompletely characterized.  Component of proximal collecting system dilatation cannot be entirely  excluded. Some additional hypoattenuating cortical foci up to 1.2 cm  may represent cysts  but incompletely characterized. Multiple cortical  and exophytic low-density left renal lesions up to at least 2.7 cm  exophytic posteriorly and also 2.7 cm posterior cortex also probably  cysts but incompletely characterized. Nonspecific perinephric  stranding bilaterally. Mild prominence left renal pelvis up to 1.7 cm  of uncertain significance. Otherwise no significant hydroureter or  ureteral stones.      PELVIS:      BLADDER:  Mild nonspecific urinary bladder wall thickening.      REPRODUCTIVE ORGANS:  Mildly enlarged prostate gland.      BOWEL:  Mildly prominent fluid-filled small bowel loops scattered throughout  overall nonspecific. Prominent colonic stool. The appendix appears  within normal limits. Tortuous and redundant sigmoid colon. Prominent  colonic stool throughout including at the rectum which is distended  up to 6.7 cm.      VESSELS:  Focal curvilinear calcific density along the left lateral aspect of  the infrarenal abdominal aorta could represent partially calcified  mural plaque or focal chronic dissection. Marked atherosclerotic  changes are noted involving the aorta and branching vessels. There is  no aneurysmal dilatation. IVC appears normal in caliber.      PERITONEUM/RETROPERITONEUM/LYMPH NODES:  No ascites or free air, no fluid collection.  Mildly prominent  nonspecific pelvic/iliac chain nodes up to 1 cm short axis. Mildly  prominent nonspecific mesenteric nodes scattered throughout.      ABDOMINAL WALL:  Moderate right and small left fat containing inguinal hernias.      BONES:  No suspicious osseous lesions are identified. Multilevel degenerative  changes visualized spine.      Impression: 1.  Right nephrolithiasis as above. Large hypoattenuating focus upper  pole right renal sinus region likely renal sinus cyst although  incompletely characterized. Component of proximal collecting system  dilatation not entirely excluded. Multiple additional hypoattenuating  renal lesions particularly  on the left likely cysts but also  incompletely characterized. Mild dilatation left renal pelvis as  above of uncertain significance. No definite hydroureter or ureteral  stones. Consider correlation with dedicated contrast-enhanced CT or  CT urography for further assessment.  2. Moderate nonspecific perinephric stranding stranding bilaterally.  3. Mild nonspecific urinary bladder wall thickening. Mildly enlarged  prostate gland. Correlate with serum PSA.  4. Prominent colonic stool throughout including at the rectum which  is distended up to 6.7 cm.  5. Distended gallbladder with probable small stones and/or sludge  dependently and focal lobulation/protrusion appearance at the fundus  likely Phrygian cap.  6. Vague approximate 1.6 cm hypoattenuating posterior right hepatic  lobe lesion not visible by prior sonography. Consider dedicated  contrast-enhanced CT or MRI for further assessment.  7. Atherosclerotic vascular disease as above. Focal curvilinear  calcific density along the left lateral aspect of the infrarenal  abdominal aorta could represent partially calcified mural plaque or  chronic focal dissection.  8. Mildly prominent nonspecific abdominopelvic nodes as above.  9. Subcentimeter lung base nodules as described for which follow-up  suggested per Fleischner criteria.  10. Mild nonspecific nonspecific thickened appearance at the  visualized distal esophagus.  11. Marked coronary artery calcifications partially imaged. Correlate  with coronary risk factors.      MACRO:  Incidental Finding:  Multiple solid non-calcified pulmonary nodules  measuring up to 6-8 mm.  (**-YCF-**)      Instructions:  Consider follow up non contrast chest CT at 3-6  months, then consider CT chest at 18-24 months. (Brent Zapata et  al., Guidelines for management of incidental pulmonary nodules  detected on CT images: From the Fleischner Society 2017, Radiology.  2017 Jul;284 (1):228-243.) [id number]      Signed by: Daniel  Aayush 11/15/2024 10:22 AM  Dictation workstation:   [id number]      Labs  Lab Results   Component Value Date    WBC 9.8 11/08/2024    HGB 12.5 (L) 11/08/2024    HCT 40.1 (L) 11/08/2024    MCV 96 11/08/2024     11/08/2024     Lab Results   Component Value Date    GLUCOSE 197 (H) 11/08/2024    CALCIUM 8.4 (L) 11/08/2024     11/08/2024    K 5.2 11/08/2024    CO2 27 11/08/2024     11/08/2024    BUN 51 (H) 11/08/2024    CREATININE 1.89 (H) 11/08/2024     Lab Results   Component Value Date    PSASCREEN 2.13 10/15/2024       Assessment and Plan:  Parth Rodríguez is a 59 y.o. male with history of right renal cyst, chronic right flank pain, who has follow up after CT scan to review results.     1.We discussed that most calculi under 5 mm can be safely observed. This recommendation is based on large series looking at spontaneous passage rates that suggest that the likelihood of stone passage is highest for stones under 4 mm in size (approximately 70-80%), moderate for stones between 4 and 6 mm (50%), and lowest for stones 6 mm or greater (20%-30%).     If unable to pass symptomatic/obstructing stone under 5 mm within 4-6 weeks, intervention is recommended.    For asymptomatic, non-infected, non-obstructing calyceal stone, then observation is an option. Will repeat Renal US in 6 months.    However, stones under 5 mm that are in a solitary kidney, associated with infection or causing significant obstruction should be removed to prevent loss of renal function and/or sepsis. Also, every patient has different anatomy and different ability to pass calculi and tolerate pain, so intervention is also recommended in patients with small stones that are in significant discomfort or that have a history of being unable to pass small calculi.     Discussed possible interventions including: Extracorporeal shock wave lithotripsy (ESWL) for stones less than 1.5 cm. Ureteroscopy with stent placement and Percutaneous  nephrolithotomy (PCNL)- minimally invasive surgery to remove stones bigger than 2 cm.    Patient understands and desires to proceed with observation.    2.We discussed that simple renal cysts are benign findings and do not require intervention unless they are causing discomfort    3.#Smoking Cessation   -I spent over 3 minutes of the visit counseling the patient in regards to cessation of smoking.         Plan:  -Reviewed CT AP scan results with patient which shows right nephrolithiasis, multiple possible renal cysts, bladder wall thickening, enlarged prostate gland.  -Patient will follow up with his PCP regarding pulmonary nodules, colon/rectum distention with stool, hepatic lesion, prominent abdominopelvic nodes seen on CT scan. He will call his PCP today  -Patient to use miralax to help with constipation  -CT urogram ordered to confirm renal cysts seen on scan and rule out malignancy  -Follow-up after CT scan, or sooner if needed, to reassess symptoms and for medication refill.    All questions and concerns were addressed. Patient verbalizes understanding and has no other questions at this time.     Some elements copied from my note on 10/15/24, the elements have been updated and all reflect current decision making from today, 11/19/24    E&M visit today is associated with current or anticipated ongoing medical care services related to a patient's single, serious condition or a complex condition.    I performed this visit using real-time telehealth tools, including an audio/video connection between Parth Rodríguez, in patient's home and VIKTORIA Peralta, in clinic.     VIKTORIA Peralta   [facility]  11/19/24

## 2024-11-19 ENCOUNTER — APPOINTMENT (OUTPATIENT)
Dept: UROLOGY | Facility: HOSPITAL | Age: 59
End: 2024-11-19
Payer: COMMERCIAL

## 2024-11-19 DIAGNOSIS — N28.1 RENAL CYST: Primary | ICD-10-CM

## 2024-11-19 DIAGNOSIS — N40.0 BENIGN PROSTATIC HYPERPLASIA WITHOUT LOWER URINARY TRACT SYMPTOMS: ICD-10-CM

## 2024-11-19 DIAGNOSIS — N20.0 NEPHROLITHIASIS: Primary | ICD-10-CM

## 2024-11-19 DIAGNOSIS — F17.210 NICOTINE DEPENDENCE, CIGARETTES, UNCOMPLICATED: ICD-10-CM

## 2024-11-19 DIAGNOSIS — N28.1 RENAL CYST: ICD-10-CM

## 2024-11-19 PROCEDURE — 3060F POS MICROALBUMINURIA REV: CPT | Performed by: NURSE PRACTITIONER

## 2024-11-19 PROCEDURE — 99214 OFFICE O/P EST MOD 30 MIN: CPT | Performed by: NURSE PRACTITIONER

## 2024-11-19 PROCEDURE — 4010F ACE/ARB THERAPY RXD/TAKEN: CPT | Performed by: NURSE PRACTITIONER

## 2024-11-19 PROCEDURE — 3046F HEMOGLOBIN A1C LEVEL >9.0%: CPT | Performed by: NURSE PRACTITIONER

## 2024-11-19 PROCEDURE — 4004F PT TOBACCO SCREEN RCVD TLK: CPT | Performed by: NURSE PRACTITIONER

## 2024-11-19 PROCEDURE — G2211 COMPLEX E/M VISIT ADD ON: HCPCS | Performed by: NURSE PRACTITIONER

## 2024-11-19 PROCEDURE — 3048F LDL-C <100 MG/DL: CPT | Performed by: NURSE PRACTITIONER

## 2024-11-19 NOTE — PROGRESS NOTES
CT urogram ordered for further evaluation of findings on recent scan. Discussed with patient, he will call and discuss scan results showing pulmonary nodules and hepatic lobe lesions and next plans. He will take miralax to promote normal daily bowel movements. Patient will call our office with any questions or concerns. Plan to schedule follow up after CT urogram to review results.    JUWAN Peralta-CNP  [facility]  11/19/2024 1:57 PM

## 2024-11-20 ENCOUNTER — TELEPHONE (OUTPATIENT)
Dept: PRIMARY CARE | Facility: CLINIC | Age: 59
End: 2024-11-20
Payer: COMMERCIAL

## 2024-11-20 DIAGNOSIS — I25.10 CORONARY ARTERY CALCIFICATION: Primary | ICD-10-CM

## 2024-11-20 DIAGNOSIS — R91.8 LUNG NODULES: ICD-10-CM

## 2024-11-25 ENCOUNTER — LAB (OUTPATIENT)
Dept: LAB | Facility: LAB | Age: 59
End: 2024-11-25
Payer: COMMERCIAL

## 2024-11-25 DIAGNOSIS — K76.0 FATTY LIVER DISEASE, NONALCOHOLIC: ICD-10-CM

## 2024-11-25 DIAGNOSIS — R79.89 ABNORMAL LIVER FUNCTION TESTS: ICD-10-CM

## 2024-11-25 LAB
HAV AB SER QL IA: REACTIVE
HBV CORE AB SER QL: NONREACTIVE

## 2024-11-25 PROCEDURE — 36415 COLL VENOUS BLD VENIPUNCTURE: CPT

## 2024-11-25 PROCEDURE — 86708 HEPATITIS A ANTIBODY: CPT

## 2024-11-25 PROCEDURE — 86704 HEP B CORE ANTIBODY TOTAL: CPT

## 2024-11-27 DIAGNOSIS — N18.32 CHRONIC KIDNEY DISEASE, STAGE 3B (MULTI): Primary | ICD-10-CM

## 2024-12-04 ENCOUNTER — HOSPITAL ENCOUNTER (OUTPATIENT)
Dept: RADIOLOGY | Facility: CLINIC | Age: 59
Discharge: HOME | End: 2024-12-04
Payer: COMMERCIAL

## 2024-12-04 DIAGNOSIS — N28.1 RENAL CYST: ICD-10-CM

## 2024-12-04 PROCEDURE — 76377 3D RENDER W/INTRP POSTPROCES: CPT

## 2024-12-04 PROCEDURE — 74178 CT ABD&PLV WO CNTR FLWD CNTR: CPT | Performed by: RADIOLOGY

## 2024-12-04 PROCEDURE — 76377 3D RENDER W/INTRP POSTPROCES: CPT | Performed by: RADIOLOGY

## 2024-12-04 PROCEDURE — 2550000001 HC RX 255 CONTRASTS: Performed by: NURSE PRACTITIONER

## 2024-12-09 ENCOUNTER — DOCUMENTATION (OUTPATIENT)
Dept: GASTROENTEROLOGY | Facility: HOSPITAL | Age: 59
End: 2024-12-09

## 2024-12-09 ENCOUNTER — CLINICAL SUPPORT (OUTPATIENT)
Dept: GASTROENTEROLOGY | Facility: CLINIC | Age: 59
End: 2024-12-09
Payer: COMMERCIAL

## 2024-12-09 DIAGNOSIS — K76.0 FATTY LIVER DISEASE, NONALCOHOLIC: ICD-10-CM

## 2024-12-09 DIAGNOSIS — R79.89 ABNORMAL LIVER FUNCTION TESTS: ICD-10-CM

## 2024-12-09 PROCEDURE — 91200 LIVER ELASTOGRAPHY: CPT | Performed by: STUDENT IN AN ORGANIZED HEALTH CARE EDUCATION/TRAINING PROGRAM

## 2024-12-09 NOTE — PROGRESS NOTES
BRIEF HEPATOLOGY PHONE NOTE    I contacted Parth Rodríguez today by phone for follow up. His fibroscan showed no fibrosis and no steatosis. He now has normal liver enzymes. There's no need for further hepatology follow up. He can continue to follow up with his PCP for better control of his diabetes.     Guevara Cline MD

## 2024-12-10 NOTE — PROGRESS NOTES
[facility]  Outpatient Clinic Note    Patient Name:  Parth Rodríguez  MRN:  [id number]  :  1965  Date of Service: 2024     Visit type: Follow up visit    HPI    Interval History:  Parth Rodríguez is a 59 y.o. male who is being seen today for  problems listed below.     Problem list/Chief complaints:  Right nephrolithiasis  Renal cysts  BPH      10/15/24: Patient referred to urology by his PCP for chronic right flank pain.  Patient reports bilateral flank pain that varies day to day and is exacerbated with activity such as walking for the past year. Pain is 6-7/10, sharp pain, constant, worse in the morning. He denies dysuria, has urgency and frequency, drinks a lot of water. No hematuria, no fever or chills. Has a strong urinary stream. No family history of prostate cancer, he is a smoker, he has cut back and is interested in quitting. Has tried chantix and got a bad reaction from it. Smoking 10-15 cigarettes a day, he is interested in quiting. No family history of kidney stones.   UA negative for infection, glucose (3+) noted. PVR 3 ml. CT AP without contrast ordered to rule out kidney stones. Screening PSA ordered. Nicoderm patch ordered for smoking cessation    24: Patient is being seen virtually for follow up and to review scan results. He does not feel well because he's currently experiencing frequent hypoglycemic episodes and his doctor is titrating his insulin. He still has intermittent right flank pain. He is using nicoderm patches which are helping with smoking cessations but he still does smoke (not with patch on). He denies hematuria, no fever or chills.    Past Medical History:   Diagnosis Date    Abscess of epididymis or testis 2019    Abscess of testis    Cannabis abuse, in remission 2020    History of cannabis abuse    Cannabis use, unspecified, uncomplicated 2019    Marijuana smoker, episodic    Cellulitis of right lower limb 2021    Cellulitis  of right lower extremity    Chronic cough 06/28/2019    Persistent dry cough    Chronic viral hepatitis C (Multi) 05/15/2019    Chronic hepatitis C without hepatic coma    Chronic viral hepatitis C (Multi) 09/10/2020    Chronic hepatitis C    Contact with and (suspected) exposure to covid-19 06/01/2020    Suspected COVID-19 virus infection    Inflammatory disorders of scrotum 03/09/2020    Scrotum, abscess    Non-pressure chronic ulcer of other part of right foot with unspecified severity (Multi) 04/18/2022    Foot ulcer, right    Persistent migraine aura without cerebral infarction, intractable, with status migrainosus 11/30/2020    Intractable persistent migraine aura without cerebral infarction and with status migrainosus    Personal history of other diseases of the circulatory system     History of hypertension    Personal history of other endocrine, nutritional and metabolic disease     History of high cholesterol    Personal history of other infectious and parasitic diseases     History of hepatitis C    Personal history of other infectious and parasitic diseases 06/21/2019    History of viral infection    Personal history of other specified conditions 09/21/2020    History of nausea and vomiting    Personal history of other specified conditions 09/21/2020    History of abdominal pain    Psychophysiologic insomnia 11/27/2019    Chronic insomnia    Rash and other nonspecific skin eruption 06/06/2019    Localized rash       Past Surgical History:   Procedure Laterality Date    OTHER SURGICAL HISTORY  05/15/2019    Stomach surgery    OTHER SURGICAL HISTORY  09/06/2019    Knee surgery    OTHER SURGICAL HISTORY  09/06/2019    Cardiac catheterization with stent placement    OTHER SURGICAL HISTORY  09/06/2019    Ankle surgery    OTHER SURGICAL HISTORY  09/06/2019    Foot surgery       Social History     Socioeconomic History    Marital status:      Spouse name: Not on file    Number of children: Not on file     Years of education: Not on file    Highest education level: Not on file   Occupational History    Not on file   Tobacco Use    Smoking status: Every Day     Current packs/day: 0.25     Average packs/day: 0.3 packs/day for 40.9 years (10.2 ttl pk-yrs)     Types: Cigarettes     Start date: 1984    Smokeless tobacco: Never   Substance and Sexual Activity    Alcohol use: Never    Drug use: Yes     Types: Marijuana    Sexual activity: Not on file   Other Topics Concern    Not on file   Social History Narrative    Not on file     Social Drivers of Health     Financial Resource Strain: Not on file   Food Insecurity: Not on file   Transportation Needs: Not on file   Physical Activity: Not on file   Stress: Not on file   Social Connections: Not on file   Intimate Partner Violence: Not on file   Housing Stability: Not on file       No Known Allergies       Current Outpatient Medications:     amoxicillin-pot clavulanate (Augmentin) 875-125 mg tablet, Take 1 tablet by mouth every 12 hours. (Patient not taking: Reported on 11/7/2024), Disp: , Rfl:     atorvastatin (Lipitor) 80 mg tablet, Take 1 tablet (80 mg) by mouth once daily., Disp: 100 tablet, Rfl: 3    brompheniramine-pseudoeph-DM 2-30-10 mg/5 mL syrup, TAKE 5 MILLILITERS, EVERY 6 HOURS AS NEEDED FOR 10 DAYS (Patient not taking: Reported on 11/7/2024), Disp: , Rfl:     buprenorphine-naloxone (Suboxone) 8-2 mg SL film, Place 1.5 Film under the tongue in the morning., Disp: , Rfl:     carvedilol (Coreg) 12.5 mg tablet, Take 1 tablet (12.5 mg) by mouth 2 times a day., Disp: 180 tablet, Rfl: 3    cetirizine (ZyrTEC) 10 mg tablet, TAKE 1 TABLET BY MOUTH EVERY DAY *BUY OTC, Disp: , Rfl:     empagliflozin (Jardiance) 10 mg, Take 1 tablet (10 mg) by mouth once daily., Disp: 90 tablet, Rfl: 3    ezetimibe (Zetia) 10 mg tablet, Take 1 tablet (10 mg) by mouth once daily., Disp: 90 tablet, Rfl: 3    glimepiride (Amaryl) 4 mg tablet, Take 1 tablet (4 mg) by mouth 2 times a day.,  "Disp: 180 tablet, Rfl: 3    insulin glargine (Lantus Solostar U-100 Insulin) 100 unit/mL (3 mL) pen, Inject 50 Units under the skin once daily., Disp: 45 mL, Rfl: 3    lancets (OneTouch UltraSoft Lancets) misc, 1 strip in the morning, at noon, and at bedtime., Disp: , Rfl:     lisinopril 20 mg tablet, Take 1 tablet (20 mg) by mouth once daily., Disp: 100 tablet, Rfl: 3    naloxone (Narcan) 4 mg/0.1 mL nasal spray, Administer 1 spray (4 mg) into affected nostril(s)., Disp: , Rfl:     nicotine (Nicoderm CQ) 21 mg/24 hr patch, Place 1 patch over 24 hours on the skin once every 24 hours., Disp: 30 patch, Rfl: 0    OneTouch Ultra Test strip, Apply 1 strip topically 4 times a day., Disp: 400 strip, Rfl: 3    pen needle, diabetic (BD Ultra-Fine Mini Pen Needle) 31 gauge x 3/16\" needle, Apply 1 each topically once daily. Use one daily with insulin pen, Disp: 100 each, Rfl: 3    triamcinolone (Kenalog) 0.1 % ointment, Apply topically 2 times a day. APPLY SPARINGLY AND RUB IN WELL TO AFFECTED AREA TWICE DAILY, Disp: 20 g, Rfl: 2     Review of system:  All other systems have been reviewed and are negative for complaints      Last recorded vitals:  There were no vitals taken for this visit.    Physical Exam: Limited due to virtual visit  General: Appears comfortable and in no apparent distress.  Lungs: Breathing is easy, non-labored. Speaking in clear and complete sentences. Normal diaphragmatic movement  Neurologic: Alert, oriented to person, place, and time  Psychiatric: mood and affect appropriate      Imaging  === 12/04/24 ===    CT UROGRAPHY WITH 3D VOLUME RENDERED IMAGING    - Impression -  1.  Previously-seen lesion of the right kidney upper pole appears  represent a parapelvic cystic lesion which demonstrates a very thin  partially calcified septation measuring up to 2 mm. This is not  adequately assessed on the current examination due to lack of  arterial phase imaging, however statistically likely represents " a  Bosniak 2 lesion. Further follow-up characterization with MRI renal  mass protocol may be obtained for definitive assessment.  2. Similar appearing nonobstructing subcentimeter calcifications the  right kidney.  3. Remaining findings appear stable since comparison CT imaging  11/14/2024.      MACRO:  None    Signed by: Lamin Hines 12/5/2024 4:01 PM  Dictation workstation:   [id number]    Labs  Lab Results   Component Value Date    WBC 9.8 11/08/2024    HGB 12.5 (L) 11/08/2024    HCT 40.1 (L) 11/08/2024    MCV 96 11/08/2024     11/08/2024     Lab Results   Component Value Date    GLUCOSE 197 (H) 11/08/2024    CALCIUM 8.4 (L) 11/08/2024     11/08/2024    K 5.2 11/08/2024    CO2 27 11/08/2024     11/08/2024    BUN 51 (H) 11/08/2024    CREATININE 1.89 (H) 11/08/2024     Lab Results   Component Value Date    PSASCREEN 2.13 10/15/2024       Assessment and Plan:  Parth Rodríguez is a 59 y.o. male with history of right renal cyst, chronic right flank pain, who has follow up after CT scan to review results.     1.We discussed that most calculi under 5 mm can be safely observed. This recommendation is based on large series looking at spontaneous passage rates that suggest that the likelihood of stone passage is highest for stones under 4 mm in size (approximately 70-80%), moderate for stones between 4 and 6 mm (50%), and lowest for stones 6 mm or greater (20%-30%).     If unable to pass symptomatic/obstructing stone under 5 mm within 4-6 weeks, intervention is recommended.    For asymptomatic, non-infected, non-obstructing calyceal stone, then observation is an option. Will repeat Renal US in 6 months.    However, stones under 5 mm that are in a solitary kidney, associated with infection or causing significant obstruction should be removed to prevent loss of renal function and/or sepsis. Also, every patient has different anatomy and different ability to pass calculi and tolerate pain, so  intervention is also recommended in patients with small stones that are in significant discomfort or that have a history of being unable to pass small calculi.     Discussed possible interventions including: Extracorporeal shock wave lithotripsy (ESWL) for stones less than 1.5 cm. Ureteroscopy with stent placement and Percutaneous nephrolithotomy (PCNL)- minimally invasive surgery to remove stones bigger than 2 cm.    Patient understands and desires to proceed with observation.    2.We discussed that simple renal cysts are benign findings and do not require intervention unless they are causing discomfort    3.#Smoking Cessation   -I spent over 3 minutes of the visit counseling the patient in regards to cessation of smoking.         Plan:    -Follow-up ***, or sooner if needed, to reassess symptoms and for medication refill.    All questions and concerns were addressed. Patient verbalizes understanding and has no other questions at this time.     Some elements copied from my note on 11/19/24, the elements have been updated and all reflect current decision making from today, 12/10/24    E&M visit today is associated with current or anticipated ongoing medical care services related to a patient's single, serious condition or a complex condition.    I performed this visit using real-time telehealth tools, including an audio/video connection between Parth Rodríguez, in patient's home and VIKTORIA Peralta, in clinic.     VIKTORIA Peralta   [facility]  12/10/24

## 2024-12-11 ENCOUNTER — APPOINTMENT (OUTPATIENT)
Dept: UROLOGY | Facility: HOSPITAL | Age: 59
End: 2024-12-11
Payer: COMMERCIAL

## 2024-12-11 PROBLEM — L97.512: Status: ACTIVE | Noted: 2024-09-27

## 2024-12-11 PROBLEM — E11.621: Status: ACTIVE | Noted: 2024-09-27

## 2024-12-11 PROBLEM — E11.9 DIABETES MELLITUS (MULTI): Status: ACTIVE | Noted: 2022-04-28

## 2024-12-11 RX ORDER — LIDOCAINE 50 MG/G
PATCH TOPICAL
COMMUNITY
Start: 2024-12-03 | End: 2024-12-16 | Stop reason: ALTCHOICE

## 2024-12-13 ENCOUNTER — TELEPHONE (OUTPATIENT)
Dept: PRIMARY CARE | Facility: CLINIC | Age: 59
End: 2024-12-13
Payer: COMMERCIAL

## 2024-12-13 DIAGNOSIS — N18.32 TYPE 2 DIABETES MELLITUS WITH STAGE 3B CHRONIC KIDNEY DISEASE, WITH LONG-TERM CURRENT USE OF INSULIN (MULTI): ICD-10-CM

## 2024-12-13 DIAGNOSIS — Z79.4 TYPE 2 DIABETES MELLITUS WITH STAGE 3B CHRONIC KIDNEY DISEASE, WITH LONG-TERM CURRENT USE OF INSULIN (MULTI): ICD-10-CM

## 2024-12-13 DIAGNOSIS — E11.22 TYPE 2 DIABETES MELLITUS WITH STAGE 3B CHRONIC KIDNEY DISEASE, WITH LONG-TERM CURRENT USE OF INSULIN (MULTI): ICD-10-CM

## 2024-12-13 RX ORDER — GLIMEPIRIDE 4 MG/1
4 TABLET ORAL 2 TIMES DAILY
Qty: 180 TABLET | Refills: 3 | Status: SHIPPED | OUTPATIENT
Start: 2024-12-13

## 2024-12-13 NOTE — TELEPHONE ENCOUNTER
glimepiride (Amaryl) 4 mg tablet//Take 1 tablet (4 mg) by mouth 2 times a day.    [facility] - [place], [place]

## 2024-12-16 ENCOUNTER — TELEPHONE (OUTPATIENT)
Dept: CARDIOLOGY | Facility: CLINIC | Age: 59
End: 2024-12-16

## 2024-12-16 ENCOUNTER — OFFICE VISIT (OUTPATIENT)
Dept: CARDIOLOGY | Facility: CLINIC | Age: 59
End: 2024-12-16
Payer: COMMERCIAL

## 2024-12-16 VITALS
BODY MASS INDEX: 29.66 KG/M2 | OXYGEN SATURATION: 96 % | HEIGHT: 72 IN | DIASTOLIC BLOOD PRESSURE: 80 MMHG | SYSTOLIC BLOOD PRESSURE: 177 MMHG | WEIGHT: 219 LBS | HEART RATE: 67 BPM

## 2024-12-16 DIAGNOSIS — I25.10 CORONARY ARTERY CALCIFICATION: Primary | ICD-10-CM

## 2024-12-16 DIAGNOSIS — E11.9 DIABETES MELLITUS (MULTI): ICD-10-CM

## 2024-12-16 DIAGNOSIS — I25.10 CORONARY ARTERY CALCIFICATION: ICD-10-CM

## 2024-12-16 DIAGNOSIS — E78.00 PURE HYPERCHOLESTEROLEMIA: ICD-10-CM

## 2024-12-16 DIAGNOSIS — I20.0 ANGINA PECTORIS, UNSTABLE (MULTI): ICD-10-CM

## 2024-12-16 DIAGNOSIS — N40.0 BENIGN PROSTATIC HYPERPLASIA WITHOUT LOWER URINARY TRACT SYMPTOMS: ICD-10-CM

## 2024-12-16 DIAGNOSIS — I10 PRIMARY HYPERTENSION: ICD-10-CM

## 2024-12-16 DIAGNOSIS — I10 PRIMARY HYPERTENSION: Primary | ICD-10-CM

## 2024-12-16 DIAGNOSIS — Z95.5 HISTORY OF CORONARY ARTERY STENT PLACEMENT: ICD-10-CM

## 2024-12-16 PROCEDURE — 3079F DIAST BP 80-89 MM HG: CPT | Performed by: INTERNAL MEDICINE

## 2024-12-16 PROCEDURE — 3077F SYST BP >= 140 MM HG: CPT | Performed by: INTERNAL MEDICINE

## 2024-12-16 PROCEDURE — 3060F POS MICROALBUMINURIA REV: CPT | Performed by: INTERNAL MEDICINE

## 2024-12-16 PROCEDURE — 4004F PT TOBACCO SCREEN RCVD TLK: CPT | Performed by: INTERNAL MEDICINE

## 2024-12-16 PROCEDURE — 3048F LDL-C <100 MG/DL: CPT | Performed by: INTERNAL MEDICINE

## 2024-12-16 PROCEDURE — 3046F HEMOGLOBIN A1C LEVEL >9.0%: CPT | Performed by: INTERNAL MEDICINE

## 2024-12-16 PROCEDURE — 99205 OFFICE O/P NEW HI 60 MIN: CPT | Performed by: INTERNAL MEDICINE

## 2024-12-16 PROCEDURE — 4010F ACE/ARB THERAPY RXD/TAKEN: CPT | Performed by: INTERNAL MEDICINE

## 2024-12-16 PROCEDURE — 99215 OFFICE O/P EST HI 40 MIN: CPT | Performed by: INTERNAL MEDICINE

## 2024-12-16 PROCEDURE — 3008F BODY MASS INDEX DOCD: CPT | Performed by: INTERNAL MEDICINE

## 2024-12-16 PROCEDURE — 93005 ELECTROCARDIOGRAM TRACING: CPT | Performed by: INTERNAL MEDICINE

## 2024-12-16 RX ORDER — ISOSORBIDE MONONITRATE 30 MG/1
30 TABLET, EXTENDED RELEASE ORAL
Qty: 90 TABLET | Refills: 3 | Status: SHIPPED | OUTPATIENT
Start: 2024-12-16 | End: 2025-12-16

## 2024-12-16 ASSESSMENT — ENCOUNTER SYMPTOMS
DEPRESSION: 0
OCCASIONAL FEELINGS OF UNSTEADINESS: 0
LOSS OF SENSATION IN FEET: 0

## 2024-12-16 ASSESSMENT — PAIN SCALES - GENERAL: PAINLEVEL_OUTOF10: 0-NO PAIN

## 2024-12-16 ASSESSMENT — COLUMBIA-SUICIDE SEVERITY RATING SCALE - C-SSRS
1. IN THE PAST MONTH, HAVE YOU WISHED YOU WERE DEAD OR WISHED YOU COULD GO TO SLEEP AND NOT WAKE UP?: NO
2. HAVE YOU ACTUALLY HAD ANY THOUGHTS OF KILLING YOURSELF?: NO
6. HAVE YOU EVER DONE ANYTHING, STARTED TO DO ANYTHING, OR PREPARED TO DO ANYTHING TO END YOUR LIFE?: NO

## 2024-12-16 ASSESSMENT — PATIENT HEALTH QUESTIONNAIRE - PHQ9
1. LITTLE INTEREST OR PLEASURE IN DOING THINGS: NOT AT ALL
SUM OF ALL RESPONSES TO PHQ9 QUESTIONS 1 AND 2: 0
2. FEELING DOWN, DEPRESSED OR HOPELESS: NOT AT ALL

## 2024-12-16 NOTE — PROGRESS NOTES
Referred by Dr. Hull for No chief complaint on file.     History Of Present Illness:    Parth Rodríguez is a 59 y.o. male with complex PMH (detailed below) presenting to outpatient cardiology clinic to establish care.  The patient reports approximately 2-month history of rapidly progressive typical angina (ROCHA, chest tightness, lightheadedness) now occurring when ascending a few stairs at home.  Symptoms reliably are brought on by exertion and relieved with rest.  Describes the symptoms as identical to those that he was experiencing prior to his previous coronary stents in 2009.  Always hypertensive today he checks his blood pressure regularly at home and is typically in the 130s systolic range.  He is accompanied by his adult daughter who confirms the patient very significant symptoms while completing activities of daily living.  The patient is now able to walk slowly on flat ground for short distances.  He denies palpitations, syncope, or changes in abdominal or lower extremity edema.    Past Medical History:  Active daily smoker  Hypertension/dyslipidemia/CAD/diabetes  History of coronary artery stent x 3, 2009 details unclear  CKD, baseline serum creatinine 1.9-2.5        Review of Systems   Constitutional:  No Weight Change, No Fever, No Chills, No Night Sweats, No Fatigue, No Malaise   ENT/Mouth:  No Hearing Changes, No Ear Pain, No Nasal Congestion, No  Sinus Pain, No Hoarseness, No sore throat, No Rhinorrhea, No Swallowing  Difficulty   Eyes:  No Eye Pain, No Swelling, No Redness, No Foreign Body, No Discharge, No Vision Changes   Cardiovascular:  See HPI   Respiratory:  No Cough, No Sputum, No Wheezing, No Smoke Exposure, No Dyspnea   Gastrointestinal:  No Nausea, No Vomiting, No Diarrhea, No  Constipation, No Pain, No Heartburn, No Anorexia, No Dysphagia, No  Hematochezia, No Melena, No Flatulence, No Jaundice   Genitourinary:  No Dysmenorrhea, No DUB, No Dyspareunia, No Dysuria, No  Urinary Frequency, No  Hematuria, No Urinary Incontinence, No Urgency,  No Flank Pain, No Urinary Flow Changes   Musculoskeletal:  No Arthralgias, No Myalgias, No Joint Swelling, No  Joint Stiffness, No Back Pain, No Neck Pain, No Injury History   Skin:  No Skin Lesions, No Pruritis, No Hair Changes, No Breast/Skin Changes, No Nipple Discharge   Neuro:  No Weakness, No Numbness, No Paresthesias, No Loss of  Consciousness, No Syncope, No Dizziness, No Headache, No Coordination  Changes, No Recent Falls   Psych:  No Anxiety/Panic, No Depression, No Insomnia, No Delusions, No Rumination, No SI/HI/AH/VH, No Social Issues,  No Memory Changes, No Violence/Abuse Hx., No Eating Concerns   Heme/Lymph:  No Bruising, No Bleeding, No Transfusions History, No Lymphadenopathy   Endocrine:  No Polyuria, No Polydipsia, No Temperature Intolerance        Past Medical History:  He has a past medical history of Abscess of epididymis or testis (09/13/2019), Cannabis abuse, in remission (07/06/2020), Cannabis use, unspecified, uncomplicated (11/01/2019), Cellulitis of right lower limb (09/21/2021), Chronic cough (06/28/2019), Chronic viral hepatitis C (Multi) (05/15/2019), Chronic viral hepatitis C (Multi) (09/10/2020), Contact with and (suspected) exposure to covid-19 (06/01/2020), Inflammatory disorders of scrotum (03/09/2020), Non-pressure chronic ulcer of other part of right foot with unspecified severity (Multi) (04/18/2022), Persistent migraine aura without cerebral infarction, intractable, with status migrainosus (11/30/2020), Personal history of other diseases of the circulatory system, Personal history of other endocrine, nutritional and metabolic disease, Personal history of other infectious and parasitic diseases, Personal history of other infectious and parasitic diseases (06/21/2019), Personal history of other specified conditions (09/21/2020), Personal history of other specified conditions (09/21/2020), Psychophysiologic insomnia (11/27/2019),  "and Rash and other nonspecific skin eruption (06/06/2019).    Past Surgical History:  He has a past surgical history that includes Other surgical history (05/15/2019); Other surgical history (09/06/2019); Other surgical history (09/06/2019); Other surgical history (09/06/2019); and Other surgical history (09/06/2019).      Social History:  He reports that he has been smoking cigarettes. He started smoking about 40 years ago. He has a 10.2 pack-year smoking history. He has never used smokeless tobacco. He reports current drug use. Drug: Marijuana. He reports that he does not drink alcohol.    Family History:  Family History   Problem Relation Name Age of Onset    Hypertension Mother      Hypertension Father      Diabetes type II Other GRANDPARENT         Allergies:  Patient has no known allergies.    Outpatient Medications:  Current Outpatient Medications   Medication Instructions    atorvastatin (LIPITOR) 80 mg, oral, Daily    buprenorphine-naloxone (Suboxone) 8-2 mg SL film 1.5 Film, Daily    carvedilol (COREG) 12.5 mg, oral, 2 times daily    cetirizine (ZyrTEC) 10 mg tablet TAKE 1 TABLET BY MOUTH EVERY DAY *BUY OTC    empagliflozin (JARDIANCE) 10 mg, oral, Daily    ezetimibe (ZETIA) 10 mg, oral, Daily    glimepiride (AMARYL) 4 mg, oral, 2 times daily    isosorbide mononitrate ER (IMDUR) 30 mg, oral, Daily with evening meal, Do not crush or chew.    lancets (OneTouch UltraSoft Lancets) misc 1 strip, 3 times daily    Lantus Solostar U-100 Insulin 50 Units, subcutaneous, Daily RT    lisinopril 20 mg, oral, Daily    naloxone (NARCAN) 4 mg    nicotine (Nicoderm CQ) 21 mg/24 hr patch 1 patch, transdermal, Every 24 hours    OneTouch Ultra Test strip 1 strip, Topical, 4 times daily    pen needle, diabetic (BD Ultra-Fine Mini Pen Needle) 31 gauge x 3/16\" needle 1 each, topical (top), Daily, Use one daily with insulin pen    triamcinolone (Kenalog) 0.1 % ointment Topical, 2 times daily, APPLY SPARINGLY AND RUB IN WELL TO " "AFFECTED AREA TWICE DAILY        Last Recorded Vitals:  Vitals:    12/16/24 1039 12/16/24 1042   BP: (!) 181/80 177/80   BP Location: Right arm Left arm   Patient Position: Sitting Sitting   BP Cuff Size: Large adult Large adult   Pulse: 67    SpO2: 96%    Weight: 99.3 kg (219 lb)    Height: 1.829 m (6')        Physical Exam:  Physical exam  GEN: calm, cooperative, asking appropriate questions  NEURO: Alert and oriented x3, CN2-12 intact, SILT, Moving all extremities without difficulty  HEENT: atraumatic, no goiter, no JVD, normal uvula   CARDS: PMI is non-displaced, RRR, no MRG  PULM: CTAB, no wheezes / rales / rhonchi   ABD: soft, non-distended, non-tender, non-tympanitic, BS in all 4 quadrants. No hepatosplenomegaly  : unremarkable  SKIN: healthy appearing, normal skin turgor   EXT: atraumatic  VASC: b/l radial pulses are brisk and equal            Last Labs:  CBC -  Lab Results   Component Value Date    WBC 9.8 11/08/2024    HGB 12.5 (L) 11/08/2024    HCT 40.1 (L) 11/08/2024    MCV 96 11/08/2024     11/08/2024       CMP -  Lab Results   Component Value Date    CALCIUM 8.4 (L) 11/08/2024    PHOS 4.3 04/12/2024    PROT 6.4 11/08/2024    ALBUMIN 4.0 11/08/2024    AST 18 11/08/2024    ALT 13 11/08/2024    ALKPHOS 115 11/08/2024    BILITOT 0.4 11/08/2024       LIPID PANEL -   Lab Results   Component Value Date    CHOL 115 08/02/2024    HDL 30.5 08/02/2024    CHHDL 3.8 08/02/2024    VLDL 30 08/02/2024    TRIG 148 08/02/2024    NHDL 85 08/02/2024       RENAL FUNCTION PANEL -   Lab Results   Component Value Date    K 5.2 11/08/2024    PHOS 4.3 04/12/2024       Lab Results   Component Value Date    HGBA1C 10.7 (H) 11/08/2024           Last Cardiology Tests:    ECG:  No results found for this or any previous visit (from the past 4464 hours).      Echo:  Echo Results:  No results found for this or any previous visit from the past 365 days.       Ejection Fractions:  No results found for: \"EF\"    Cath:  No results " found for this or any previous visit from the past 365 days.        Stress Test:  Stress Results:  No results found for this or any previous visit from the past 365 days.       Cardiac Imaging:  Liver Elastography (Fibroscan)  FIBROSCAN (LIVER ELASTOGRAPHY) INTERPRETATION    Parth Rodríguez presented for a Fibroscan. The most recent documented BMI   is 27.71.    Results:  E (median liver stiffness measurement): 4.3 kPa  CAP (controlled attenuation parameter): 209 dB/m    Interpretation:  This was a technically adequate study.   The Fibrosis score is consistent with Metavir F0-1.   The CAP score is consistent with steatosis grade 0.    *For patients with a BMI greater than 30-35 kg/m2, especially central   adiposity, consider Magnetic Resonance Elastography (MRE).   *Fibrosis threshold vary depending on the etiology (HBV, HCV, MASLD, ALD,   and cholestatic disease)    Guevara Cline MD  Hepatology and Liver Transplant  [facility]      Assessment/Plan   This is a 59-year-old with a complex past medical history notable for multiple coronary stents placed in approximately 2009, details are unclear.  He is describing accelerating angina x 2 months with symptoms identical to those that he experienced prior to his previous coronary interventions.  I am concerned by the patient's overall presentation, and we discussed the risks and benefits of medical therapy versus noninvasive stress testing versus upfront invasive approach to his overall evaluation.  After extensive conversation with the patient and his adult daughter, the decision was made to proceed to the cardiac catheterization laboratory for invasive coronary angiography with possible stent placement if needed.  Please see detailed problem based assessment / plan below    # accelerating typical angina in the setting of previous coronary stents  - continue aspirin / statin / carvedilol  - start imdur 30mg  - invasive [facility] +  coronary angiography    # diabetes  - recommend more aggressive management of diabetes, last Hba1c 10    # CKD  - explained the elevated risk of TERESA on CKD during upcoming coronary angiogram    # active smoker  - recommend immediate smoking cessation    # Cardiovascular Health Maintenance  - Physical Activity: Encourage 10-15K steps per day  - Healthy Diet: Avoid high fat and high sodium foods (processed meats / fast foods)  --- consider a mediterranean or DASH diet, emphasizing vegetables, fruits, whole grains, lean proteins  - Avoidance of smoking and smoke exposure    Problem List Items Addressed This Visit             ICD-10-CM    Primary hypertension - Primary I10    Relevant Orders    ECG 12 lead (Clinic Performed)    Case Request Cath Lab: Left Heart Cath with Coronary Angiography and LV (Completed)    Coronary artery calcification I25.10    Relevant Medications    isosorbide mononitrate ER (Imdur) 30 mg 24 hr tablet    Other Relevant Orders    Case Request Cath Lab: Left Heart Cath with Coronary Angiography and LV (Completed)    History of coronary artery stent placement Z95.5    Relevant Orders    Case Request Cath Lab: Left Heart Cath with Coronary Angiography and LV (Completed)    Angina pectoris, unstable (Multi) I20.0    Relevant Medications    isosorbide mononitrate ER (Imdur) 30 mg 24 hr tablet    Other Relevant Orders    Case Request Cath Lab: Left Heart Cath with Coronary Angiography and LV (Completed)           Time Spent: I spent 40 minutes reviewing medical testing, obtaining medical history and counselling and educating on diagnosis and documenting clinical encounter.   C. Barton Gillombardo, MD  Interventional Cardiology  Pager: [phone]

## 2024-12-17 ENCOUNTER — LAB (OUTPATIENT)
Dept: LAB | Facility: LAB | Age: 59
End: 2024-12-17
Payer: COMMERCIAL

## 2024-12-17 DIAGNOSIS — I20.0 ANGINA PECTORIS, UNSTABLE (MULTI): ICD-10-CM

## 2024-12-17 DIAGNOSIS — E11.9 DIABETES MELLITUS (MULTI): ICD-10-CM

## 2024-12-17 DIAGNOSIS — I25.10 CORONARY ARTERY CALCIFICATION: ICD-10-CM

## 2024-12-17 DIAGNOSIS — E78.00 PURE HYPERCHOLESTEROLEMIA: ICD-10-CM

## 2024-12-17 DIAGNOSIS — I10 PRIMARY HYPERTENSION: ICD-10-CM

## 2024-12-17 DIAGNOSIS — N18.32 CHRONIC KIDNEY DISEASE, STAGE 3B (MULTI): ICD-10-CM

## 2024-12-17 LAB
ANION GAP SERPL CALC-SCNC: 14 MMOL/L (ref 10–20)
ATRIAL RATE: 63 BPM
BUN SERPL-MCNC: 38 MG/DL (ref 6–23)
CALCIUM SERPL-MCNC: 8.5 MG/DL (ref 8.6–10.3)
CHLORIDE SERPL-SCNC: 103 MMOL/L (ref 98–107)
CO2 SERPL-SCNC: 25 MMOL/L (ref 21–32)
CREAT SERPL-MCNC: 2.03 MG/DL (ref 0.5–1.3)
EGFRCR SERPLBLD CKD-EPI 2021: 37 ML/MIN/1.73M*2
ERYTHROCYTE [DISTWIDTH] IN BLOOD BY AUTOMATED COUNT: 13 % (ref 11.5–14.5)
GLUCOSE SERPL-MCNC: 73 MG/DL (ref 74–99)
HCT VFR BLD AUTO: 40.5 % (ref 41–52)
HGB BLD-MCNC: 12.5 G/DL (ref 13.5–17.5)
INR PPP: 1 (ref 0.9–1.1)
MCH RBC QN AUTO: 29.8 PG (ref 26–34)
MCHC RBC AUTO-ENTMCNC: 30.9 G/DL (ref 32–36)
MCV RBC AUTO: 97 FL (ref 80–100)
NRBC BLD-RTO: 0 /100 WBCS (ref 0–0)
P AXIS: 55 DEGREES
P OFFSET: 167 MS
P ONSET: 108 MS
PLATELET # BLD AUTO: 221 X10*3/UL (ref 150–450)
POTASSIUM SERPL-SCNC: 4.9 MMOL/L (ref 3.5–5.3)
PR INTERVAL: 232 MS
PROTHROMBIN TIME: 11.8 SECONDS (ref 9.8–12.8)
Q ONSET: 224 MS
QRS COUNT: 10 BEATS
QRS DURATION: 154 MS
QT INTERVAL: 410 MS
QTC CALCULATION(BAZETT): 419 MS
QTC FREDERICIA: 416 MS
R AXIS: 148 DEGREES
RBC # BLD AUTO: 4.19 X10*6/UL (ref 4.5–5.9)
SODIUM SERPL-SCNC: 137 MMOL/L (ref 136–145)
T AXIS: 15 DEGREES
T OFFSET: 429 MS
VENTRICULAR RATE: 63 BPM
WBC # BLD AUTO: 7.8 X10*3/UL (ref 4.4–11.3)

## 2024-12-17 PROCEDURE — 85610 PROTHROMBIN TIME: CPT

## 2024-12-17 PROCEDURE — 36415 COLL VENOUS BLD VENIPUNCTURE: CPT

## 2024-12-17 PROCEDURE — 85027 COMPLETE CBC AUTOMATED: CPT

## 2024-12-17 PROCEDURE — 80048 BASIC METABOLIC PNL TOTAL CA: CPT

## 2024-12-19 ENCOUNTER — HOSPITAL ENCOUNTER (OUTPATIENT)
Facility: HOSPITAL | Age: 59
Setting detail: OUTPATIENT SURGERY
Discharge: HOME | End: 2024-12-19
Attending: INTERNAL MEDICINE | Admitting: INTERNAL MEDICINE
Payer: COMMERCIAL

## 2024-12-19 VITALS
BODY MASS INDEX: 29.65 KG/M2 | SYSTOLIC BLOOD PRESSURE: 128 MMHG | HEART RATE: 54 BPM | OXYGEN SATURATION: 93 % | DIASTOLIC BLOOD PRESSURE: 55 MMHG | WEIGHT: 218.92 LBS | RESPIRATION RATE: 10 BRPM | TEMPERATURE: 96.8 F | HEIGHT: 72 IN

## 2024-12-19 DIAGNOSIS — I25.10 CORONARY ARTERY CALCIFICATION: ICD-10-CM

## 2024-12-19 DIAGNOSIS — I20.0 ANGINA PECTORIS, UNSTABLE (MULTI): ICD-10-CM

## 2024-12-19 DIAGNOSIS — I10 PRIMARY HYPERTENSION: Primary | ICD-10-CM

## 2024-12-19 DIAGNOSIS — Z95.5 HISTORY OF CORONARY ARTERY STENT PLACEMENT: ICD-10-CM

## 2024-12-19 LAB
GLUCOSE BLD MANUAL STRIP-MCNC: 144 MG/DL (ref 74–99)
GLUCOSE BLD MANUAL STRIP-MCNC: 149 MG/DL (ref 74–99)

## 2024-12-19 PROCEDURE — 2550000001 HC RX 255 CONTRASTS: Performed by: INTERNAL MEDICINE

## 2024-12-19 PROCEDURE — 93458 L HRT ARTERY/VENTRICLE ANGIO: CPT | Performed by: INTERNAL MEDICINE

## 2024-12-19 PROCEDURE — 99222 1ST HOSP IP/OBS MODERATE 55: CPT | Performed by: NURSE PRACTITIONER

## 2024-12-19 PROCEDURE — 99152 MOD SED SAME PHYS/QHP 5/>YRS: CPT | Performed by: INTERNAL MEDICINE

## 2024-12-19 PROCEDURE — 2720000007 HC OR 272 NO HCPCS: Performed by: INTERNAL MEDICINE

## 2024-12-19 PROCEDURE — 82947 ASSAY GLUCOSE BLOOD QUANT: CPT

## 2024-12-19 PROCEDURE — 2500000004 HC RX 250 GENERAL PHARMACY W/ HCPCS (ALT 636 FOR OP/ED): Mod: JZ | Performed by: INTERNAL MEDICINE

## 2024-12-19 PROCEDURE — C1760 CLOSURE DEV, VASC: HCPCS | Performed by: INTERNAL MEDICINE

## 2024-12-19 PROCEDURE — 7100000010 HC PHASE TWO TIME - EACH INCREMENTAL 1 MINUTE: Performed by: INTERNAL MEDICINE

## 2024-12-19 PROCEDURE — C1894 INTRO/SHEATH, NON-LASER: HCPCS | Performed by: INTERNAL MEDICINE

## 2024-12-19 PROCEDURE — 7100000009 HC PHASE TWO TIME - INITIAL BASE CHARGE: Performed by: INTERNAL MEDICINE

## 2024-12-19 RX ORDER — ACETAMINOPHEN 160 MG/5ML
650 SOLUTION ORAL EVERY 6 HOURS PRN
Status: DISCONTINUED | OUTPATIENT
Start: 2024-12-19 | End: 2024-12-19 | Stop reason: HOSPADM

## 2024-12-19 RX ORDER — NITROGLYCERIN 40 MG/100ML
INJECTION INTRAVENOUS AS NEEDED
Status: DISCONTINUED | OUTPATIENT
Start: 2024-12-19 | End: 2024-12-19 | Stop reason: HOSPADM

## 2024-12-19 RX ORDER — MIDAZOLAM HYDROCHLORIDE 1 MG/ML
INJECTION, SOLUTION INTRAMUSCULAR; INTRAVENOUS AS NEEDED
Status: DISCONTINUED | OUTPATIENT
Start: 2024-12-19 | End: 2024-12-19 | Stop reason: HOSPADM

## 2024-12-19 RX ORDER — ACETAMINOPHEN 325 MG/1
650 TABLET ORAL EVERY 6 HOURS PRN
Status: DISCONTINUED | OUTPATIENT
Start: 2024-12-19 | End: 2024-12-19 | Stop reason: HOSPADM

## 2024-12-19 RX ORDER — DEXTROSE 50 % IN WATER (D50W) INTRAVENOUS SYRINGE
25
Status: DISCONTINUED | OUTPATIENT
Start: 2024-12-19 | End: 2024-12-19 | Stop reason: HOSPADM

## 2024-12-19 RX ORDER — FENTANYL CITRATE 50 UG/ML
INJECTION, SOLUTION INTRAMUSCULAR; INTRAVENOUS AS NEEDED
Status: DISCONTINUED | OUTPATIENT
Start: 2024-12-19 | End: 2024-12-19 | Stop reason: HOSPADM

## 2024-12-19 RX ORDER — ASPIRIN 81 MG/1
81 TABLET ORAL DAILY
COMMUNITY

## 2024-12-19 RX ORDER — HEPARIN SODIUM 1000 [USP'U]/ML
INJECTION, SOLUTION INTRAVENOUS; SUBCUTANEOUS AS NEEDED
Status: DISCONTINUED | OUTPATIENT
Start: 2024-12-19 | End: 2024-12-19 | Stop reason: HOSPADM

## 2024-12-19 RX ORDER — ACETAMINOPHEN 650 MG/1
650 SUPPOSITORY RECTAL EVERY 6 HOURS PRN
Status: DISCONTINUED | OUTPATIENT
Start: 2024-12-19 | End: 2024-12-19 | Stop reason: HOSPADM

## 2024-12-19 RX ORDER — DEXTROSE 50 % IN WATER (D50W) INTRAVENOUS SYRINGE
12.5
Status: DISCONTINUED | OUTPATIENT
Start: 2024-12-19 | End: 2024-12-19 | Stop reason: HOSPADM

## 2024-12-19 RX ORDER — NAPROXEN SODIUM 220 MG/1
81 TABLET, FILM COATED ORAL ONCE
Status: DISCONTINUED | OUTPATIENT
Start: 2024-12-19 | End: 2024-12-19 | Stop reason: HOSPADM

## 2024-12-19 RX ORDER — LIDOCAINE HYDROCHLORIDE 10 MG/ML
INJECTION, SOLUTION EPIDURAL; INFILTRATION; INTRACAUDAL; PERINEURAL AS NEEDED
Status: DISCONTINUED | OUTPATIENT
Start: 2024-12-19 | End: 2024-12-19 | Stop reason: HOSPADM

## 2024-12-19 ASSESSMENT — PAIN - FUNCTIONAL ASSESSMENT
PAIN_FUNCTIONAL_ASSESSMENT: 0-10

## 2024-12-19 ASSESSMENT — ENCOUNTER SYMPTOMS
RHINORRHEA: 1
SHORTNESS OF BREATH: 1
PSYCHIATRIC NEGATIVE: 1
ENDOCRINE NEGATIVE: 1
HEMATOLOGIC/LYMPHATIC NEGATIVE: 1
EYES NEGATIVE: 1
ALLERGIC/IMMUNOLOGIC NEGATIVE: 1
MUSCULOSKELETAL NEGATIVE: 1
GASTROINTESTINAL NEGATIVE: 1
NEUROLOGICAL NEGATIVE: 1
FATIGUE: 1

## 2024-12-19 ASSESSMENT — PAIN SCALES - GENERAL
PAINLEVEL_OUTOF10: 0 - NO PAIN

## 2024-12-19 ASSESSMENT — COLUMBIA-SUICIDE SEVERITY RATING SCALE - C-SSRS
6. HAVE YOU EVER DONE ANYTHING, STARTED TO DO ANYTHING, OR PREPARED TO DO ANYTHING TO END YOUR LIFE?: NO
2. HAVE YOU ACTUALLY HAD ANY THOUGHTS OF KILLING YOURSELF?: NO
1. IN THE PAST MONTH, HAVE YOU WISHED YOU WERE DEAD OR WISHED YOU COULD GO TO SLEEP AND NOT WAKE UP?: NO

## 2024-12-19 NOTE — H&P
History Of Present Illness  Parth Rodríguez is a 59 y.o. male presenting with angina, here for [facility] with Dr. Gillombardio. PMHx significant for CAD s/p 3 stents in 2009, CKD III-IV, Hep C, NAFLD, DM2, yperparathyroidism, renal cyst, history of opioid dependence, vitamin B12 deficiency.     Past Medical History:  Past Medical History:   Diagnosis Date    Abscess of epididymis or testis 09/13/2019    Abscess of testis    Cannabis abuse, in remission 07/06/2020    History of cannabis abuse    Cannabis use, unspecified, uncomplicated 11/01/2019    Marijuana smoker, episodic    Cellulitis of right lower limb 09/21/2021    Cellulitis of right lower extremity    Chronic cough 06/28/2019    Persistent dry cough    Chronic viral hepatitis C (Multi) 05/15/2019    Chronic hepatitis C without hepatic coma    Chronic viral hepatitis C (Multi) 09/10/2020    Chronic hepatitis C    Contact with and (suspected) exposure to covid-19 06/01/2020    Suspected COVID-19 virus infection    Diabetes mellitus (Multi)     Hypertension     Inflammatory disorders of scrotum 03/09/2020    Scrotum, abscess    Non-pressure chronic ulcer of other part of right foot with unspecified severity (Multi) 04/18/2022    Foot ulcer, right    Persistent migraine aura without cerebral infarction, intractable, with status migrainosus 11/30/2020    Intractable persistent migraine aura without cerebral infarction and with status migrainosus    Personal history of other diseases of the circulatory system     History of hypertension    Personal history of other endocrine, nutritional and metabolic disease     History of high cholesterol    Personal history of other infectious and parasitic diseases     History of hepatitis C    Personal history of other infectious and parasitic diseases 06/21/2019    History of viral infection    Personal history of other specified conditions 09/21/2020    History of nausea and vomiting    Personal history of other specified  conditions 09/21/2020    History of abdominal pain    Psychophysiologic insomnia 11/27/2019    Chronic insomnia    Rash and other nonspecific skin eruption 06/06/2019    Localized rash        Past Surgical History:  Past Surgical History:   Procedure Laterality Date    OTHER SURGICAL HISTORY  05/15/2019    Stomach surgery    OTHER SURGICAL HISTORY  09/06/2019    Knee surgery    OTHER SURGICAL HISTORY  09/06/2019    Cardiac catheterization with stent placement    OTHER SURGICAL HISTORY  09/06/2019    Ankle surgery    OTHER SURGICAL HISTORY  09/06/2019    Foot surgery          Social History:   reports that he has been smoking cigarettes. He started smoking about 40 years ago. He has a 10.2 pack-year smoking history. He has never used smokeless tobacco. He reports current drug use. Drug: Marijuana. He reports that he does not drink alcohol.     Family History:  Family History   Problem Relation Name Age of Onset    Hypertension Mother      Hypertension Father      Diabetes type II Other GRANDPARENT         Allergies:  No Known Allergies     Home Medications:  Current Outpatient Medications   Medication Instructions    aspirin 81 mg, Daily    atorvastatin (LIPITOR) 80 mg, oral, Daily    buprenorphine-naloxone (Suboxone) 8-2 mg SL film 1.5 Film, Daily    carvedilol (COREG) 12.5 mg, oral, 2 times daily    cetirizine (ZyrTEC) 10 mg tablet TAKE 1 TABLET BY MOUTH EVERY DAY *BUY OTC    empagliflozin (JARDIANCE) 10 mg, oral, Daily    ezetimibe (ZETIA) 10 mg, oral, Daily    glimepiride (AMARYL) 4 mg, oral, 2 times daily    isosorbide mononitrate ER (IMDUR) 30 mg, oral, Daily with evening meal, Do not crush or chew.    lancets (OneTouch UltraSoft Lancets) misc 1 strip, 3 times daily    Lantus Solostar U-100 Insulin 50 Units, subcutaneous, Daily RT    lisinopril 20 mg, oral, Daily    naloxone (NARCAN) 4 mg    nicotine (Nicoderm CQ) 21 mg/24 hr patch 1 patch, transdermal, Every 24 hours    OneTouch Ultra Test strip 1 strip,  "Topical, 4 times daily    pen needle, diabetic (BD Ultra-Fine Mini Pen Needle) 31 gauge x 3/16\" needle 1 each, topical (top), Daily, Use one daily with insulin pen    triamcinolone (Kenalog) 0.1 % ointment Topical, 2 times daily, APPLY SPARINGLY AND RUB IN WELL TO AFFECTED AREA TWICE DAILY       Inpatient Medications:  Scheduled medications   Medication Dose Route Frequency    aspirin  81 mg oral Once     PRN medications   Medication    dextrose    dextrose    glucagon    glucagon     Continuous Medications   Medication Dose Last Rate         Review of Systems   Constitutional:  Positive for fatigue.   HENT:  Positive for rhinorrhea.    Eyes: Negative.    Respiratory:  Positive for shortness of breath.    Cardiovascular:  Positive for chest pain.   Gastrointestinal: Negative.    Endocrine: Negative.    Genitourinary: Negative.    Musculoskeletal: Negative.    Skin: Negative.    Allergic/Immunologic: Negative.    Neurological: Negative.    Hematological: Negative.    Psychiatric/Behavioral: Negative.            Physical Exam  General:  Patient is awake, alert, and oriented.  Patient is in no acute distress.  HEENT:  Pupils equal and reactive.  Normocephalic.  Moist mucosa.    Neck:  No JVD.   Cardiovascular:  Regular rate and rhythm.  Normal S1 and S2. No murmurs/rubs/gallops. Radial pulses 2+.   Pulmonary:  Clear to auscultation bilaterally.  Abdomen:  Soft. Non-tender.   Non-distended.  Positive bowel sounds.  Lower Extremities:  Pedal pulses 2+ No LE edema.  Neurologic:  Cranial nerves II-XII grossly intact.   No focal deficit.   Skin: Skin warm and dry, no lesions. Normal skin turgor.   Psychiatric: Normal affect.     Sedation Preparation     NPO since 0000    Last Recorded Vitals  Blood pressure 156/68, pulse 56, temperature 36 °C (96.8 °F), temperature source Tympanic, resp. rate 18, height 1.829 m (6'), weight 99.3 kg (218 lb 14.7 oz), SpO2 99%.         Vitals from the Past 24 Hours  Heart Rate:  [56]   Temp:  " "[36 °C (96.8 °F)]   Resp:  [18]   BP: (156)/(68)   Height:  [182.9 cm (6')]   Weight:  [99.3 kg (218 lb 14.7 oz)]   SpO2:  [99 %]          Relevant Results    Labs    POCT Glucose:   Results from last 7 days   Lab Units 12/19/24  0958   POCT GLUCOSE mg/dL 144*      POCT INR:   Results from last 7 days   Lab Units 12/17/24  1201   INR  1.0     POCT Urine Pregnancy:       CBC:   Recent Labs     12/17/24  1201 11/08/24  1116 04/12/24  1018 01/10/24  0828 03/30/23  1030 11/09/22  1032   WBC 7.8 9.8 9.7 10.4 9.2 11.1   HGB 12.5* 12.5* 12.4* 12.3* 12.1* 11.5*   HCT 40.5* 40.1* 40.0* 39.1* 38.3* 36.1*    244 258 279 244 228   MCV 97 96 96 95 92 95     BMP/CMP:   Recent Labs     12/17/24  1201 11/08/24  1116 08/02/24  1248 04/12/24  1018 01/10/24  0828 03/30/23  1030 12/13/22  1136 11/09/22  1032 04/14/22  0845    137 137 137 139 136   < > 135* 133*   K 4.9 5.2 5.5* 5.2 4.8 4.7   < > 5.9* 5.1    102 105 104 107 102   < > 102 103   BUN 38* 51* 60* 43* 36* 37*   < > 39* 42*   CREATININE 2.03* 1.89* 2.47* 2.39* 1.97* 2.03*   < > 2.20* 2.18*   CO2 25 27 23 27 24 27   < > 28 21   CALCIUM 8.5* 8.4* 8.5* 8.6 8.7 8.7   < > 8.7 8.7   PROT  --  6.4 7.1  --  6.6 7.1  --  6.8 7.3   BILITOT  --  0.4 0.3  --  0.3 0.5  --  0.3 0.4   ALKPHOS  --  115 116  --  90 143*  --  132* 119   ALT  --  13 9*  --  14 16  --  13 9*   AST  --  18 14  --  16 17  --  15 14   GLUCOSE 73* 197* 105* 165* 135* 144*   < > 228* 133*    < > = values in this interval not displayed.      Magnesium: No results for input(s): \"MG\" in the last 65363 hours.  Lipid Panel:   Recent Labs     08/02/24  1248 01/10/24  0828 03/30/23  1030 11/09/22  1032 04/14/22  0845 12/22/21  1125 09/21/21  1011 06/22/21  1006 02/01/21  1558 09/09/20  0905 06/08/20  1028 12/17/19  1519   CHOL 115 108 83 91 159 122 114 127 96 87 159 116   HDL 30.5 31.3 23.2* 25.0* 28.0* 37.7* 28.0* 31.9* 27.3* 31.7* 31.1* 35.2*   CHHDL 3.8 3.5 3.6 3.6 5.7* 3.2 4.1 4.0 3.5 2.7 5.1* 3.3 " "  VLDL 30 35 29 41* 38 45* 36 46* 34 21 47* 37   TRIG 148 173* 144 205* 190* 227* 181* 228* 172* 107 236* 186*   NHDL 85 77  --  66  --  84  --  95  --   --  128  --      Cardiac       No lab exists for component: \"CK\", \"CKMBP\"   Hemoglobin A1C:   Recent Labs     11/08/24  1116 08/02/24  1248 01/10/24  0828 03/30/23  1030 11/09/22  1032 04/28/22  1013 12/22/21  1125 09/21/21  1011 06/22/21  1006 02/01/21  1558 09/09/20  0905 06/08/20  1028   HGBA1C 10.7* 8.7* 12.7* 11.2* 9.2* 7.8* 8.3* 8.3* 7.9 8.1 7.4 7.8     TSH/ Free T4:   Recent Labs     03/30/23  1030 04/14/22  0845 09/21/21  1011 06/22/21  1006 06/08/20  1028 05/15/19  1202   TSH 1.74 1.26 1.73 0.97 1.58 1.31     Iron:   Recent Labs     11/08/24  1116 03/15/21  1000 09/09/20  0905 05/17/19  1129   FERRITIN 58 63 47 50   TIBC 302  --  284 385   IRONSAT 27  --  27 25     Coag:   Results from last 7 days   Lab Units 12/17/24  1201   INR  1.0     ABO: No results found for: \"ABO\"    Past Cardiology Tests (Last 3 Years):    EKG:  Recent Labs     12/16/24  1030   ATRRATE 63   VENTRATE 63   PRINT 232   QRSDUR 154   QTCFRED 416   QTCCALCB 419     Encounter Date: 12/16/24   ECG 12 lead (Clinic Performed)   Result Value    Ventricular Rate 63    Atrial Rate 63    WY Interval 232    QRS Duration 154    QT Interval 410    QTC Calculation(Bazett) 419    P Axis 55    R Axis 148    T Axis 15    QRS Count 10    Q Onset 224    P Onset 108    P Offset 167    T Offset 429    QTC Fredericia 416    Narrative    Sinus rhythm with 1st degree AV block  Right bundle branch block  Abnormal ECG     Echo:  Echocardiogram: No results found for this or any previous visit from the past 1800 days.    Ejection Fractions:  No results found for: \"EF\"  Cath:  Coronary Angiography: No results found for this or any previous visit from the past 1800 days.    Right Heart Cath: No results found for this or any previous visit from the past 1800 days.    Stress Test:  Nuclear:No results found for this " or any previous visit from the past 1800 days.    Metabolic Stress: No results found for this or any previous visit from the past 1800 days.    Cardiac Imaging:  Cardiac Scoring: No results found for this or any previous visit from the past 1800 days.    Cardiac MRI: No results found for this or any previous visit from the past 1800 days.         Assessment/Plan  Assessment/Plan   Assessment & Plan  Primary hypertension    Coronary artery calcification    History of coronary artery stent placement    Angina pectoris, unstable (Multi)        -[facility] with Dr. Gillombardo   -ASA 81 mg daily PO taken pre-procedure        NP discussed with Dr. Gillombardo regarding plan of care/ discharge plan      I spent 30 minutes in the professional and overall care of this patient.      Renee Kenney, APRN-CNP

## 2024-12-19 NOTE — DISCHARGE INSTRUCTIONS
CARDIAC CATHETERIZATION DISCHARGE INSTRUCTIONS    Follow up with Shi Jameson CNP (Dr. Gillombardo's NP)  as scheduled on 2/5/25 at 1 pm.      FOR SUDDEN AND SEVERE CHEST PAIN, SHORTNESS OF BREATH, EXCESSIVE BLEEDING, SIGNS OF STROKE, OR CHANGES IN MENTAL STATUS YOU SHOULD CALL 911 IMMEDIATELY.     If your provider has prescribed aspirin and/or clopidogrel (Plavix), or prasugrel (Effient), or ticagrelor (Brilinta), DO NOT STOP THESE MEDICATIONS for any reason without talking to your cardiologist first. If any of these were prescribed, you must take them every day without missing a single dose. If you are getting low on these medications, contact your provider immediately for a refill.     FOR NEXT 24 HOURS  - Upon discharge, you should return home and rest for the remainder of the day and evening. You do not have to stay on bed rest but should not be very active.  It is recommended a responsible adult be with you for the first 24 hours after the procedure.    - No driving for 24 hours after procedure. Please arrange for someone to drive you home from the hospital today.     - Do not drive, operate machinery, or use power tools for 24 hours after your procedure.     - Do not make any legal decisions for 24 hours after your procedure.     - Do not drink alcoholic beverages for 24 hours after your procedure.    -Stay extra hydrated for 24 hours after your procedure; goal fluid intake around 72-96 ounces for the next 24 hours.       WOUND CARE   *FOR FEMORAL (LEG) ACCESS*  ·      Avoid heavy lifting (over 10 pounds) for 7 days, squatting or excessive bending for 2 days, and strenuous exercise for 7 days.  ·      No submerged bathing, swimming, or hot tubs for the next 7 days, or until fully healed.  ·      Avoid sexual activity for 3-4 days until any groin discomfort has ceased.     *FOR RADIAL (WRIST) ACCESS*  ·      No lifting more than 5 pounds or excessive use of the wrist for 24 hours - for example, treat  your wrist as if it is sprained.  ·      Do not engage in vigorous activities (tennis, golf, bowling, weights) for at least 48 hours after the procedure.  ·      Do not submerge the wrist for 7 days after the procedure.  ·      You should expect mild tingling in your hand and tenderness at the puncture site for up to 3 days.    - The transparent dressing should be removed from the site 24 hours after the procedure.  Wash the site gently with soap and water. Rinse well and pat dry. Keep the area clean and dry. You may apply a Band-Aid to the site. Avoid lotions, ointments, or powders until fully healed.     - You may shower the day after your procedure.      - It is normal to notice a small bruise around the puncture site and/or a small grape sized or smaller lump. Any large bruising or large lump warrants a call to the office.     - If bleeding should occur, lay down and apply pressure to the affected area for 10 minutes.  If the bleeding stops notify your physician.  If there is a large amount of bleeding or spurting of blood CALL 911 immediately.  DO NOT drive yourself to the hospital.    - You may experience some tenderness, bruising or minimal inflammation.  If you have any concerns, you may contact the Cath Lab or if any of these symptoms become excessive, contact your cardiologist or go to the emergency room.     OTHER INSTRUCTIONS  - You may take acetaminophen (Tylenol) as directed for discomfort.  If pain is not relieved with acetaminophen (Tylenol), contact your doctor.    - If you notice or experience any of the following, you should notify your doctor or seek medical attention  Chest pain or discomfort  Change in mental status or weakness in extremities.  Dizziness, light headedness, or feeling faint.  Change in the site where the procedure was performed, such as bleeding or an increased area of bruising or swelling.  Tingling, numbness, pain, or coolness in the leg/arm beyond the site where the procedure  was performed.  Signs of infection (i.e. shaking chills, temperature > 100 degrees Fahrenheit, warmth, redness) in the leg/arm area where the procedure was performed.  Changes in urination   Bloody or black stools  Vomiting blood  Severe nose bleeds  Any excessive bleeding    - If you DO NOT have an appointment with your cardiologist within 2-4 weeks following your procedure, please contact their office.

## 2024-12-19 NOTE — POST-PROCEDURE NOTE
Physician Transition of Care Summary  Invasive Cardiovascular Lab    Procedure Date: 12/19/2024  Attending:    * Carl B Gillombardo - Primary  Resident/Fellow/Other Assistant: Surgeons and Role:  * No surgeons found with a matching role *    Indications:   Pre-op Diagnosis      * Primary hypertension [I10]     * Coronary artery calcification [I25.10]     * History of coronary artery stent placement [Z95.5]     * Angina pectoris, unstable (Multi) [I20.0]    Post-procedure diagnosis:   Post-op Diagnosis     * Primary hypertension [I10]     * Coronary artery calcification [I25.10]     * History of coronary artery stent placement [Z95.5]     * Angina pectoris, unstable (Multi) [I20.0]    Procedure(s):   Left Heart Cath with Coronary Angiography and   47701 - SD CATH PLMT L HRT & ARTS W/NJX & ANGIO IMG S&I        Procedure Findings:   Right dominant coronary circulation  Mild to moderate diffuse nonobstructive coronary artery disease    Description of the Procedure:   6 [place] right radial artery access, hemostasis achieved using single TR band    Complications:   None    Stents/Implants:   Implants       No implant documentation for this case.            Anticoagulation/Antiplatelet Plan:   5000 periprocedural heparin    Estimated Blood Loss:   10 mL    Anesthesia: Moderate Sedation Anesthesia Staff: No anesthesia staff entered.    Any Specimen(s) Removed:   No specimens collected during this procedure.    Disposition:   Routine post-cath care      Electronically signed by: Carl B Gillombardo, MD, 12/19/2024 11:16 AM

## 2024-12-20 ENCOUNTER — LAB (OUTPATIENT)
Dept: LAB | Facility: LAB | Age: 59
End: 2024-12-20
Payer: COMMERCIAL

## 2024-12-20 ENCOUNTER — HOSPITAL ENCOUNTER (OUTPATIENT)
Dept: RADIOLOGY | Facility: HOSPITAL | Age: 59
Discharge: HOME | End: 2024-12-20
Payer: COMMERCIAL

## 2024-12-20 DIAGNOSIS — E11.22 TYPE 2 DIABETES MELLITUS WITH STAGE 4 CHRONIC KIDNEY DISEASE, WITH LONG-TERM CURRENT USE OF INSULIN (MULTI): ICD-10-CM

## 2024-12-20 DIAGNOSIS — N18.4 TYPE 2 DIABETES MELLITUS WITH STAGE 4 CHRONIC KIDNEY DISEASE, WITH LONG-TERM CURRENT USE OF INSULIN (MULTI): ICD-10-CM

## 2024-12-20 DIAGNOSIS — E11.21 TYPE 2 DIABETES MELLITUS WITH DIABETIC NEPHROPATHY: ICD-10-CM

## 2024-12-20 DIAGNOSIS — Z79.4 TYPE 2 DIABETES MELLITUS WITH STAGE 4 CHRONIC KIDNEY DISEASE, WITH LONG-TERM CURRENT USE OF INSULIN (MULTI): ICD-10-CM

## 2024-12-20 PROCEDURE — 36415 COLL VENOUS BLD VENIPUNCTURE: CPT

## 2024-12-20 PROCEDURE — 76770 US EXAM ABDO BACK WALL COMP: CPT

## 2024-12-20 PROCEDURE — 83036 HEMOGLOBIN GLYCOSYLATED A1C: CPT

## 2024-12-21 LAB
EST. AVERAGE GLUCOSE BLD GHB EST-MCNC: 214 MG/DL
HBA1C MFR BLD: 9.1 %

## 2025-01-03 LAB
ATRIAL RATE: 63 BPM
P AXIS: 55 DEGREES
P OFFSET: 167 MS
P ONSET: 108 MS
PR INTERVAL: 232 MS
Q ONSET: 224 MS
QRS COUNT: 10 BEATS
QRS DURATION: 154 MS
QT INTERVAL: 410 MS
QTC CALCULATION(BAZETT): 419 MS
QTC FREDERICIA: 416 MS
R AXIS: 148 DEGREES
T AXIS: 15 DEGREES
T OFFSET: 429 MS
VENTRICULAR RATE: 63 BPM

## 2025-01-07 ENCOUNTER — APPOINTMENT (OUTPATIENT)
Dept: PULMONOLOGY | Facility: HOSPITAL | Age: 60
End: 2025-01-07
Payer: COMMERCIAL

## 2025-01-07 ASSESSMENT — ENCOUNTER SYMPTOMS
SHORTNESS OF BREATH: 0
RHINORRHEA: 0
UNEXPECTED WEIGHT CHANGE: 0
FATIGUE: 0
COUGH: 0
CHILLS: 0
FEVER: 0
WHEEZING: 0

## 2025-01-07 NOTE — PROGRESS NOTES
Subjective   Patient ID: Parth Rodríguez is a 59 y.o. male who presents for NPV for lung nodules.     HPI: Patient has PMH of nicotine dependence, DM, HLD, anemia, HTN, and CAD. He was referred by PCP for lung nodules.     Review of Systems   Constitutional:  Negative for chills, fatigue, fever and unexpected weight change.   HENT:  Negative for congestion, postnasal drip and rhinorrhea.    Respiratory:  Negative for cough (denies hemoptysis.), shortness of breath and wheezing.    Cardiovascular:  Negative for chest pain and leg swelling.   All other systems reviewed and are negative.      Objective   Physical Exam  Vitals reviewed.   Constitutional:       Appearance: Normal appearance.   HENT:      Head: Normocephalic.   Cardiovascular:      Rate and Rhythm: Normal rate and regular rhythm.   Pulmonary:      Effort: Pulmonary effort is normal.      Breath sounds: Normal breath sounds.   Skin:     General: Skin is warm and dry.   Neurological:      Mental Status: He is alert.         Assessment/Plan

## 2025-01-12 NOTE — PROGRESS NOTES
[facility]  Outpatient Clinic Note    Patient Name:  Parth Rodríguez  MRN:  [id number]  :  1965  Date of Service: 2025       Visit type: Follow up visit    HPI    Interval History:  Parth Rodríguez is a 59 y.o. male who is being seen today for  problems listed below.     Problem list/Chief complaints:  Right nephrolithiasis  Renal cysts  BPH      10/15/24: Patient referred to urology by his PCP for chronic right flank pain.  Patient reports bilateral flank pain that varies day to day and is exacerbated with activity such as walking for the past year. Pain is 6-7/10, sharp pain, constant, worse in the morning. He denies dysuria, has urgency and frequency, drinks a lot of water. No hematuria, no fever or chills. Has a strong urinary stream. No family history of prostate cancer, he is a smoker, he has cut back and is interested in quitting. Has tried chantix and got a bad reaction from it. Smoking 10-15 cigarettes a day, he is interested in quiting. No family history of kidney stones.   UA negative for infection, glucose (3+) noted. PVR 3 ml. CT AP without contrast ordered to rule out kidney stones. Screening PSA ordered. Nicoderm patch ordered for smoking cessation    24: Patient is being seen virtually for follow up and to review scan results. He does not feel well because he's currently experiencing frequent hypoglycemic episodes and his doctor is titrating his insulin. He still has intermittent right flank pain. He is using nicoderm patches which are helping with smoking cessations but he still does smoke (not with patch on). He denies hematuria, no fever or chills.    25: Patient is accompanied by his son. He has no new urinary issues. He sometimes has to strain to empty his bladder, no issues with urinary stream. No dysuria, no hematuria, no fever or chills. He stopped using nicotine patches because he couldn't adhere to them. He will follow up with his PCP regarding smoking  cessation.   MÓNICA: 17  IPSS: 16  QOL: 2    I personally reviewed Renal US dated 12/20/24.   IMPRESSION:  No hydronephrosis bilaterally.      Bilateral varying sized renal cysts including a right central renal  6.5 cm cyst similar to prior CT.      Nonspecific mild urinary bladder wall thickening.  Past Medical History:   Diagnosis Date    Abscess of epididymis or testis 09/13/2019    Abscess of testis    Cannabis abuse, in remission 07/06/2020    History of cannabis abuse    Cannabis use, unspecified, uncomplicated 11/01/2019    Marijuana smoker, episodic    Cellulitis of right lower limb 09/21/2021    Cellulitis of right lower extremity    Chronic cough 06/28/2019    Persistent dry cough    Chronic viral hepatitis C (Multi) 05/15/2019    Chronic hepatitis C without hepatic coma    Chronic viral hepatitis C (Multi) 09/10/2020    Chronic hepatitis C    Contact with and (suspected) exposure to covid-19 06/01/2020    Suspected COVID-19 virus infection    Diabetes mellitus (Multi)     Hypertension     Inflammatory disorders of scrotum 03/09/2020    Scrotum, abscess    Non-pressure chronic ulcer of other part of right foot with unspecified severity (Multi) 04/18/2022    Foot ulcer, right    Persistent migraine aura without cerebral infarction, intractable, with status migrainosus 11/30/2020    Intractable persistent migraine aura without cerebral infarction and with status migrainosus    Personal history of other diseases of the circulatory system     History of hypertension    Personal history of other endocrine, nutritional and metabolic disease     History of high cholesterol    Personal history of other infectious and parasitic diseases     History of hepatitis C    Personal history of other infectious and parasitic diseases 06/21/2019    History of viral infection    Personal history of other specified conditions 09/21/2020    History of nausea and vomiting    Personal history of other specified conditions  09/21/2020    History of abdominal pain    Psychophysiologic insomnia 11/27/2019    Chronic insomnia    Rash and other nonspecific skin eruption 06/06/2019    Localized rash       Past Surgical History:   Procedure Laterality Date    CARDIAC CATHETERIZATION N/A 12/19/2024    Procedure: Left Heart Cath with Coronary Angiography and LV;  Surgeon: Carl B Gillombardo, MD;  Location: [facility] Cardiac Cath Lab;  Service: Cardiovascular;  Laterality: N/A;  LEFT HEART CATH THUR DEC 19TH, 2024 AT 11:00 AM PT WILL ARRIVE AT 9:30 AM @ [facility] DR. GILLOMBARDO    OTHER SURGICAL HISTORY  05/15/2019    Stomach surgery    OTHER SURGICAL HISTORY  09/06/2019    Knee surgery    OTHER SURGICAL HISTORY  09/06/2019    Cardiac catheterization with stent placement    OTHER SURGICAL HISTORY  09/06/2019    Ankle surgery    OTHER SURGICAL HISTORY  09/06/2019    Foot surgery       Social History     Socioeconomic History    Marital status:      Spouse name: Not on file    Number of children: Not on file    Years of education: Not on file    Highest education level: Not on file   Occupational History    Not on file   Tobacco Use    Smoking status: Every Day     Current packs/day: 0.25     Average packs/day: 0.3 packs/day for 41.0 years (10.3 ttl pk-yrs)     Types: Cigarettes     Start date: 1984    Smokeless tobacco: Never   Substance and Sexual Activity    Alcohol use: Never    Drug use: Yes     Types: Marijuana    Sexual activity: Not on file   Other Topics Concern    Not on file   Social History Narrative    Not on file     Social Drivers of Health     Financial Resource Strain: Not on file   Food Insecurity: Not on file   Transportation Needs: Not on file   Physical Activity: Not on file   Stress: Not on file   Social Connections: Not on file   Intimate Partner Violence: Not on file   Housing Stability: Not on file       No Known Allergies       Current Outpatient Medications:     aspirin 81 mg EC tablet, Take 1 tablet (81 mg) by mouth  "once daily., Disp: , Rfl:     atorvastatin (Lipitor) 80 mg tablet, Take 1 tablet (80 mg) by mouth once daily., Disp: 100 tablet, Rfl: 3    buprenorphine-naloxone (Suboxone) 8-2 mg SL film, Place 1.5 Film under the tongue once daily., Disp: , Rfl:     carvedilol (Coreg) 12.5 mg tablet, Take 1 tablet (12.5 mg) by mouth 2 times a day., Disp: 180 tablet, Rfl: 3    cetirizine (ZyrTEC) 10 mg tablet, TAKE 1 TABLET BY MOUTH EVERY DAY *BUY OTC, Disp: , Rfl:     empagliflozin (Jardiance) 10 mg, Take 1 tablet (10 mg) by mouth once daily., Disp: 90 tablet, Rfl: 3    ezetimibe (Zetia) 10 mg tablet, Take 1 tablet (10 mg) by mouth once daily., Disp: 90 tablet, Rfl: 3    glimepiride (Amaryl) 4 mg tablet, Take 1 tablet (4 mg) by mouth 2 times a day., Disp: 180 tablet, Rfl: 3    insulin glargine (Lantus Solostar U-100 Insulin) 100 unit/mL (3 mL) pen, Inject 50 Units under the skin once daily. (Patient taking differently: Inject 40 Units under the skin once daily.), Disp: 45 mL, Rfl: 3    isosorbide mononitrate ER (Imdur) 30 mg 24 hr tablet, Take 1 tablet (30 mg) by mouth once daily in the evening. Take with meals. Do not crush or chew., Disp: 90 tablet, Rfl: 3    lancets (OneTouch UltraSoft Lancets) misc, 1 strip in the morning, at noon, and at bedtime., Disp: , Rfl:     lisinopril 20 mg tablet, Take 1 tablet (20 mg) by mouth once daily., Disp: 100 tablet, Rfl: 3    naloxone (Narcan) 4 mg/0.1 mL nasal spray, Administer 1 spray (4 mg) into affected nostril(s)., Disp: , Rfl:     nicotine (Nicoderm CQ) 21 mg/24 hr patch, Place 1 patch over 24 hours on the skin once every 24 hours., Disp: 30 patch, Rfl: 0    OneTouch Ultra Test strip, Apply 1 strip topically 4 times a day., Disp: 400 strip, Rfl: 3    pen needle, diabetic (BD Ultra-Fine Mini Pen Needle) 31 gauge x 3/16\" needle, Apply 1 each topically once daily. Use one daily with insulin pen, Disp: 100 each, Rfl: 3    triamcinolone (Kenalog) 0.1 % ointment, Apply topically 2 times a day. " APPLY SPARINGLY AND RUB IN WELL TO AFFECTED AREA TWICE DAILY, Disp: 20 g, Rfl: 2     Review of system:  All other systems have been reviewed and are negative for complaints      Last recorded vitals:  There were no vitals taken for this visit.    Physical Exam: Limited due to virtual visit  General: Appears comfortable and in no apparent distress.  Head: Normocephalic, atraumatic  Eyes: Non-injected conjunctiva, sclera clear, no proptosis  Lungs: Breathing is easy, non-labored. Speaking in clear and complete sentences. Normal diaphragmatic movement.  Cardiovascular: no peripheral edema, cyanosis or pallor.   Abdomen: soft, non-distended, non-tender  : Bladder: non tender, not distended  MSK: Ambulatory with steady gait, unassisted  Skin: No visible rashes or lesions  Neurologic: Alert, oriented to person, place, and time  Psychiatric: mood and affect appropriate        Imaging  === 12/20/24 ===    US RENAL COMPLETE    - Impression -  No hydronephrosis bilaterally.    Bilateral varying sized renal cysts including a right central renal  6.5 cm cyst similar to prior CT.    Nonspecific mild urinary bladder wall thickening.    MACRO:  None.    Signed by: Jerzy Arriaga 12/23/2024 6:16 PM  Dictation workstation:   [id number]      === 12/04/24 ===    CT UROGRAPHY WITH 3D VOLUME RENDERED IMAGING    - Impression -  1.  Previously-seen lesion of the right kidney upper pole appears  represent a parapelvic cystic lesion which demonstrates a very thin  partially calcified septation measuring up to 2 mm. This is not  adequately assessed on the current examination due to lack of  arterial phase imaging, however statistically likely represents a  Bosniak 2 lesion. Further follow-up characterization with MRI renal  mass protocol may be obtained for definitive assessment.  2. Similar appearing nonobstructing subcentimeter calcifications the  right kidney.  3. Remaining findings appear stable since comparison CT  imaging  11/14/2024.      MACRO:  None    Signed by: Lamin Hines 12/5/2024 4:01 PM  Dictation workstation:   [id number]    Labs  Lab Results   Component Value Date    WBC 7.8 12/17/2024    HGB 12.5 (L) 12/17/2024    HCT 40.5 (L) 12/17/2024    MCV 97 12/17/2024     12/17/2024     Lab Results   Component Value Date    GLUCOSE 73 (L) 12/17/2024    CALCIUM 8.5 (L) 12/17/2024     12/17/2024    K 4.9 12/17/2024    CO2 25 12/17/2024     12/17/2024    BUN 38 (H) 12/17/2024    CREATININE 2.03 (H) 12/17/2024     Lab Results   Component Value Date    PSASCREEN 2.13 10/15/2024       Assessment and Plan:  Parth Rodríguez is a 59 y.o. male with history of right renal cyst, chronic right flank pain, who presents for follow up.     1.We discussed that most calculi under 5 mm can be safely observed. This recommendation is based on large series looking at spontaneous passage rates that suggest that the likelihood of stone passage is highest for stones under 4 mm in size (approximately 70-80%), moderate for stones between 4 and 6 mm (50%), and lowest for stones 6 mm or greater (20%-30%).     If unable to pass symptomatic/obstructing stone under 5 mm within 4-6 weeks, intervention is recommended.    For asymptomatic, non-infected, non-obstructing calyceal stone, then observation is an option. Will repeat Renal US in 6 months.    However, stones under 5 mm that are in a solitary kidney, associated with infection or causing significant obstruction should be removed to prevent loss of renal function and/or sepsis. Also, every patient has different anatomy and different ability to pass calculi and tolerate pain, so intervention is also recommended in patients with small stones that are in significant discomfort or that have a history of being unable to pass small calculi.     Discussed possible interventions including: Extracorporeal shock wave lithotripsy (ESWL) for stones less than 1.5 cm. Ureteroscopy with stent  placement and Percutaneous nephrolithotomy (PCNL)- minimally invasive surgery to remove stones bigger than 2 cm.    Patient understands and desires to proceed with observation.    2.We discussed that simple renal cysts are benign findings and do not require intervention unless they are causing discomfort    3.#Smoking Cessation   -I spent over 3 minutes of the visit counseling the patient in regards to cessation of smoking.         Plan:  -Reviewed CT urogram and Renal US results with patient and his son  -Referral for cystoscopy with Urology MD placed for bladder wall thickening  -Will monitor PSA yearly  -Follow-up after cystoscopy, or sooner if needed, to reassess symptoms and for medication refill.    All questions and concerns were addressed. Patient verbalizes understanding and has no other questions at this time.     Some elements copied from my note on 11/19/24, the elements have been updated and all reflect current decision making from today, 01/13/25    E&M visit today is associated with current or anticipated ongoing medical care services related to a patient's single, serious condition or a complex condition.    JUWAN Peralta-CNP   [facility]  01/13/25

## 2025-01-13 ENCOUNTER — OFFICE VISIT (OUTPATIENT)
Dept: UROLOGY | Facility: HOSPITAL | Age: 60
End: 2025-01-13
Payer: COMMERCIAL

## 2025-01-13 DIAGNOSIS — N28.1 RENAL CYST: ICD-10-CM

## 2025-01-13 DIAGNOSIS — N32.89 BLADDER WALL THICKENING: ICD-10-CM

## 2025-01-13 DIAGNOSIS — N20.0 NEPHROLITHIASIS: Primary | ICD-10-CM

## 2025-01-13 PROCEDURE — 99213 OFFICE O/P EST LOW 20 MIN: CPT | Performed by: NURSE PRACTITIONER

## 2025-01-13 PROCEDURE — 99406 BEHAV CHNG SMOKING 3-10 MIN: CPT | Performed by: NURSE PRACTITIONER

## 2025-01-13 PROCEDURE — G2211 COMPLEX E/M VISIT ADD ON: HCPCS | Performed by: NURSE PRACTITIONER

## 2025-01-13 PROCEDURE — 4010F ACE/ARB THERAPY RXD/TAKEN: CPT | Performed by: NURSE PRACTITIONER

## 2025-01-27 ENCOUNTER — APPOINTMENT (OUTPATIENT)
Dept: UROLOGY | Facility: HOSPITAL | Age: 60
End: 2025-01-27
Payer: COMMERCIAL

## 2025-02-03 ENCOUNTER — OFFICE VISIT (OUTPATIENT)
Dept: PULMONOLOGY | Facility: HOSPITAL | Age: 60
End: 2025-02-03
Payer: COMMERCIAL

## 2025-02-03 VITALS
HEIGHT: 72 IN | WEIGHT: 218 LBS | OXYGEN SATURATION: 97 % | HEART RATE: 60 BPM | BODY MASS INDEX: 29.53 KG/M2 | SYSTOLIC BLOOD PRESSURE: 159 MMHG | RESPIRATION RATE: 16 BRPM | DIASTOLIC BLOOD PRESSURE: 68 MMHG

## 2025-02-03 DIAGNOSIS — R91.8 LUNG NODULES: ICD-10-CM

## 2025-02-03 DIAGNOSIS — F17.210 CIGARETTE SMOKER: ICD-10-CM

## 2025-02-03 DIAGNOSIS — J44.9 CHRONIC OBSTRUCTIVE PULMONARY DISEASE, UNSPECIFIED COPD TYPE (MULTI): Primary | ICD-10-CM

## 2025-02-03 PROCEDURE — 3008F BODY MASS INDEX DOCD: CPT | Performed by: NURSE PRACTITIONER

## 2025-02-03 PROCEDURE — 4010F ACE/ARB THERAPY RXD/TAKEN: CPT | Performed by: NURSE PRACTITIONER

## 2025-02-03 PROCEDURE — 4004F PT TOBACCO SCREEN RCVD TLK: CPT | Performed by: NURSE PRACTITIONER

## 2025-02-03 PROCEDURE — 99204 OFFICE O/P NEW MOD 45 MIN: CPT | Performed by: NURSE PRACTITIONER

## 2025-02-03 PROCEDURE — 3077F SYST BP >= 140 MM HG: CPT | Performed by: NURSE PRACTITIONER

## 2025-02-03 PROCEDURE — 3078F DIAST BP <80 MM HG: CPT | Performed by: NURSE PRACTITIONER

## 2025-02-03 PROCEDURE — 99214 OFFICE O/P EST MOD 30 MIN: CPT | Performed by: NURSE PRACTITIONER

## 2025-02-03 RX ORDER — TIOTROPIUM BROMIDE AND OLODATEROL 3.124; 2.736 UG/1; UG/1
2 SPRAY, METERED RESPIRATORY (INHALATION) DAILY
Qty: 4 G | Refills: 11 | Status: SHIPPED | OUTPATIENT
Start: 2025-02-03 | End: 2025-02-04 | Stop reason: SDUPTHER

## 2025-02-03 RX ORDER — ALBUTEROL SULFATE 90 UG/1
4 INHALANT RESPIRATORY (INHALATION) ONCE
OUTPATIENT
Start: 2025-02-03

## 2025-02-03 RX ORDER — ALBUTEROL SULFATE 0.83 MG/ML
3 SOLUTION RESPIRATORY (INHALATION) ONCE
OUTPATIENT
Start: 2025-02-03 | End: 2025-02-03

## 2025-02-03 RX ORDER — ALBUTEROL SULFATE 90 UG/1
2 INHALANT RESPIRATORY (INHALATION) EVERY 4 HOURS PRN
Qty: 18 G | Refills: 11 | Status: SHIPPED | OUTPATIENT
Start: 2025-02-03

## 2025-02-03 ASSESSMENT — ENCOUNTER SYMPTOMS
WHEEZING: 1
FEVER: 0
CHILLS: 0
UNEXPECTED WEIGHT CHANGE: 0
FATIGUE: 0
SHORTNESS OF BREATH: 1
COUGH: 1
RHINORRHEA: 0

## 2025-02-03 NOTE — PROGRESS NOTES
Subjective   Patient ID: Parth Rodríguez is a 59 y.o. male who presents for NPV for lung nodules.     HPI: Patient has PMH of nicotine dependence, DM, HLD, anemia, HTN, and CAD. He was referred by PCP for lung nodules. He states that he has shortness of breath on exertion and a daily dry and productive cough. He will also hear himself wheezing frequently. He has never been on any inhalers or diagnosed with COPD in the past. He states that he is not currently very active but is trying to exercise more at home. He states that a few times a year he will wake up in the middle of the night with trouble breathing. He denies snoring. He is a current smoker at less than 1 ppd but mostly smoked 1 ppd since 1984. He has worked in a factory in the past.     Review of Systems   Constitutional:  Negative for chills, fatigue, fever and unexpected weight change.   HENT:  Negative for congestion, postnasal drip and rhinorrhea.    Respiratory:  Positive for cough (denies hemoptysis.), shortness of breath and wheezing.    Cardiovascular:  Negative for chest pain and leg swelling.   All other systems reviewed and are negative.      Objective   Physical Exam  Vitals reviewed.   Constitutional:       Appearance: Normal appearance.   HENT:      Head: Normocephalic.   Cardiovascular:      Rate and Rhythm: Normal rate and regular rhythm.   Pulmonary:      Effort: Pulmonary effort is normal.      Breath sounds: Decreased breath sounds (scattered wheezing) present.   Skin:     General: Skin is warm and dry.   Neurological:      Mental Status: He is alert.         Assessment/Plan   COPD  Nicotine dependence  Lung nodules    Plan:    -PFTs ordered.  -AAT ordered.  -Start on Stiolto 2 puffs once a day, everyday. Educated on use.   -Start on albuterol 1-2 puffs as needed, educated on use.  -He is a current smoker at less than 1 ppd but mostly smoked at 1 ppd, he has tried to quit several times and has tried Chantix in the past. He declines NRT  today.   -He has had a LDCT in 2022 with small lung nodules and mild emphysema. He had CT abdomen done in October and showed some lung nodules also. I recommend getting a LDCT chest now, order placed. A shared decision making was done regarding the importance of Low Dose CT chest in screening for lung nodules. Discussed patient's risk factors for malignancy and qualifications for screening test. Discussed the follow up steps if nodules are found based on RAD guidelines. Patient understands and would proceed with Lung Cancer Screening CT chest protocol.      Overall we discussed COPD at length and I will optimize management for him today I suspect that his SOB and chronic cough is secondary to uncontrolled COPD. I will obtain testing and see him back in 3 months. I instructed patient to call sooner if needed.      Total time:  45 min.

## 2025-02-03 NOTE — PATIENT INSTRUCTIONS
Start on Stiolto 2 puffs once a day, everyday.   Start albuterol 1-2 puffs as needed, you can use this every four hours for shortness of breath, cough, or wheezing.  Please get CT scan of your chest for lung cancer screening.   Please get lung test done.   Please get blood work next time you have this drawn.  Call me with any questions or concerns.  Follow up with me in 3 months.

## 2025-02-04 DIAGNOSIS — J44.9 CHRONIC OBSTRUCTIVE PULMONARY DISEASE, UNSPECIFIED COPD TYPE (MULTI): ICD-10-CM

## 2025-02-04 RX ORDER — TIOTROPIUM BROMIDE AND OLODATEROL 3.124; 2.736 UG/1; UG/1
2 SPRAY, METERED RESPIRATORY (INHALATION) DAILY
Qty: 4 G | Refills: 11 | Status: SHIPPED | OUTPATIENT
Start: 2025-02-04

## 2025-02-05 ENCOUNTER — APPOINTMENT (OUTPATIENT)
Dept: CARDIOLOGY | Facility: HOSPITAL | Age: 60
End: 2025-02-05
Payer: COMMERCIAL

## 2025-02-06 ENCOUNTER — APPOINTMENT (OUTPATIENT)
Dept: PRIMARY CARE | Facility: CLINIC | Age: 60
End: 2025-02-06
Payer: COMMERCIAL

## 2025-02-06 ENCOUNTER — APPOINTMENT (OUTPATIENT)
Dept: UROLOGY | Facility: HOSPITAL | Age: 60
End: 2025-02-06
Payer: COMMERCIAL

## 2025-02-06 VITALS
SYSTOLIC BLOOD PRESSURE: 145 MMHG | HEART RATE: 76 BPM | BODY MASS INDEX: 30.35 KG/M2 | DIASTOLIC BLOOD PRESSURE: 78 MMHG | WEIGHT: 229 LBS | RESPIRATION RATE: 14 BRPM | HEIGHT: 73 IN

## 2025-02-06 DIAGNOSIS — Z79.4 TYPE 2 DIABETES MELLITUS WITH STAGE 3B CHRONIC KIDNEY DISEASE, WITH LONG-TERM CURRENT USE OF INSULIN (MULTI): Primary | ICD-10-CM

## 2025-02-06 DIAGNOSIS — E11.22 TYPE 2 DIABETES MELLITUS WITH STAGE 3B CHRONIC KIDNEY DISEASE, WITH LONG-TERM CURRENT USE OF INSULIN (MULTI): Primary | ICD-10-CM

## 2025-02-06 DIAGNOSIS — F11.21 OPIOID DEPENDENCE, IN REMISSION: ICD-10-CM

## 2025-02-06 DIAGNOSIS — Z00.00 ROUTINE MEDICAL EXAM: ICD-10-CM

## 2025-02-06 DIAGNOSIS — I10 PRIMARY HYPERTENSION: ICD-10-CM

## 2025-02-06 DIAGNOSIS — E78.00 PURE HYPERCHOLESTEROLEMIA: ICD-10-CM

## 2025-02-06 DIAGNOSIS — N18.32 TYPE 2 DIABETES MELLITUS WITH STAGE 3B CHRONIC KIDNEY DISEASE, WITH LONG-TERM CURRENT USE OF INSULIN (MULTI): Primary | ICD-10-CM

## 2025-02-06 PROCEDURE — G2211 COMPLEX E/M VISIT ADD ON: HCPCS | Performed by: FAMILY MEDICINE

## 2025-02-06 PROCEDURE — 4004F PT TOBACCO SCREEN RCVD TLK: CPT | Performed by: FAMILY MEDICINE

## 2025-02-06 PROCEDURE — 3077F SYST BP >= 140 MM HG: CPT | Performed by: FAMILY MEDICINE

## 2025-02-06 PROCEDURE — 3008F BODY MASS INDEX DOCD: CPT | Performed by: FAMILY MEDICINE

## 2025-02-06 PROCEDURE — 99214 OFFICE O/P EST MOD 30 MIN: CPT | Performed by: FAMILY MEDICINE

## 2025-02-06 PROCEDURE — 4010F ACE/ARB THERAPY RXD/TAKEN: CPT | Performed by: FAMILY MEDICINE

## 2025-02-06 PROCEDURE — 3078F DIAST BP <80 MM HG: CPT | Performed by: FAMILY MEDICINE

## 2025-02-06 PROCEDURE — G0439 PPPS, SUBSEQ VISIT: HCPCS | Performed by: FAMILY MEDICINE

## 2025-02-06 RX ORDER — EZETIMIBE 10 MG/1
10 TABLET ORAL DAILY
Qty: 90 TABLET | Refills: 3 | Status: SHIPPED | OUTPATIENT
Start: 2025-02-06

## 2025-02-06 RX ORDER — BLOOD SUGAR DIAGNOSTIC
1 STRIP MISCELLANEOUS 3 TIMES DAILY
Qty: 300 STRIP | Refills: 3 | Status: SHIPPED | OUTPATIENT
Start: 2025-02-06

## 2025-02-06 ASSESSMENT — ACTIVITIES OF DAILY LIVING (ADL)
TAKING_MEDICATION: INDEPENDENT
DRESSING: INDEPENDENT
BATHING: INDEPENDENT
MANAGING_FINANCES: INDEPENDENT
DOING_HOUSEWORK: INDEPENDENT
GROCERY_SHOPPING: INDEPENDENT

## 2025-02-06 ASSESSMENT — PATIENT HEALTH QUESTIONNAIRE - PHQ9
SUM OF ALL RESPONSES TO PHQ9 QUESTIONS 1 AND 2: 0
SUM OF ALL RESPONSES TO PHQ9 QUESTIONS 1 AND 2: 0
1. LITTLE INTEREST OR PLEASURE IN DOING THINGS: NOT AT ALL
1. LITTLE INTEREST OR PLEASURE IN DOING THINGS: NOT AT ALL
2. FEELING DOWN, DEPRESSED OR HOPELESS: NOT AT ALL
2. FEELING DOWN, DEPRESSED OR HOPELESS: NOT AT ALL

## 2025-02-06 NOTE — ASSESSMENT & PLAN NOTE
Hyperlipidemia on zetia and statin. No GI upset or muscle ache. Will monitor labs for evaluation.  Health diet and regular exercise. Decrease calorie intake to lose wt.  f/u in 3 mos.     Orders:    ezetimibe (Zetia) 10 mg tablet; Take 1 tablet (10 mg) by mouth once daily.

## 2025-02-06 NOTE — PROGRESS NOTES
"Subjective   Patient ID: Parth Rodríguez is a 59 y.o. male who presents for Medicare Annual Wellness Visit Subsequent (fu).    HPI   Patient has been compliant with taking all  current medications. FBG has been at 90's most days. Pt had  hypoglycemic episodes. No polydipsia, polyuria or significant weight changes. No lower extremities skin lesion. Stable  LE numbness and  tingling. No blurry vision. No GI upset  or muscle ache while on statin and zetia for high lipids. Normal appetite. No CP, HA, dizziness, heart palpitation. No claudication or cold LE.  No LE edema. No imbalance or falls. Good mood. No opioid abuse while on suboxone.     Review of Systems    Objective   /78   Pulse 76   Resp 14   Ht 1.854 m (6' 1\")   Wt 104 kg (229 lb)   BMI 30.21 kg/m²     Physical Exam  NAD, well groomed, normal pupil size, No sclera icterus. neck: supple, no cervical or axillary lymphadenopathy,  lungs: mild wheezing b/l, heart: RRR, No LE edema, normal pedal pulses, abd: soft, no tenderness, BS+, normal strength but decreased sensation  at bilateral lower extremities. Good balance. CNII-XII were grossly intact, good judgment and memory. No depressed mood.    Assessment/Plan   Assessment & Plan  Pure hypercholesterolemia  Hyperlipidemia on zetia and statin. No GI upset or muscle ache. Will monitor labs for evaluation.  Health diet and regular exercise. Decrease calorie intake to lose wt.  f/u in 3 mos.     Orders:    ezetimibe (Zetia) 10 mg tablet; Take 1 tablet (10 mg) by mouth once daily.    Type 2 diabetes mellitus with stage 3b chronic kidney disease, with long-term current use of insulin (Multi)  DMII, mostly  controlled lately. Continue current medications. Will monitor A1C, urine albumin. advise eye exam by an OD yearly and checking bilateral feet for skin lesions qhs. Healthy diet and regular exercise. Decrease calorie intake to lose wt.  Pt declined to see a neurologist for LE numbness    Orders:    OneTouch " Ultra Test strip; 1 strip 3 times a day.    Primary hypertension  BP has not  been controlled. Per pt, home bp has been at 130/70. Continue BP pills. keep a daily  bp log and bring in the log at the next office visit. Call office if BP is persistently over 130/80. DASH diet and regular exercise. Decrease calorie intake to lose wt.           Opioid dependence, in remission  Pt denies any opioid abuse. Cravings and withdrawals were controlled. continue current buprenorphine treatment.  Pt should not drive or operate machinery if incoordination or confusion occurs. f/u with suboxone physician. Francisco Javier mancuso         Routine medical exam [Z00.00]  Medicare wellness visit: pt was capable of performing all ADLs and IADLs. Pt has good memory and cognitive function. pt is  obese. Recommend healthy diet and regular exercise. pt declined to see a nutritionist for eval. Advise eye exam by an OD yearly for glaucoma screen and dental exam every 6 months. will monitor blood pressure, cholesterol levels and weight regularly.  Recommend shingle vaccines, hep A/B vaccine, flu,  pcv20 and covid shot.   Recommend pt to clear throw rugs at home and keep good lighting at night to avoid falls. Keep hot water for shower below 120F to avoid burn injury.  recommend grab bar at bathtub.   recommend to update living will and DPOA  stop smoking cigarettes. pt  had been taking all current  medications as prescribed.

## 2025-02-06 NOTE — ASSESSMENT & PLAN NOTE
BP has not  been controlled. Per pt, home bp has been at 130/70. Continue BP pills. keep a daily  bp log and bring in the log at the next office visit. Call office if BP is persistently over 130/80. DASH diet and regular exercise. Decrease calorie intake to lose wt.

## 2025-02-06 NOTE — ASSESSMENT & PLAN NOTE
DMII, mostly  controlled lately. Continue current medications. Will monitor A1C, urine albumin. advise eye exam by an OD yearly and checking bilateral feet for skin lesions qhs. Healthy diet and regular exercise. Decrease calorie intake to lose wt.  Pt declined to see a neurologist for LE numbness    Orders:    OneTouch Ultra Test strip; 1 strip 3 times a day.

## 2025-02-12 ENCOUNTER — APPOINTMENT (OUTPATIENT)
Dept: RESPIRATORY THERAPY | Facility: HOSPITAL | Age: 60
End: 2025-02-12
Payer: COMMERCIAL

## 2025-02-19 ENCOUNTER — HOSPITAL ENCOUNTER (OUTPATIENT)
Dept: RADIOLOGY | Facility: CLINIC | Age: 60
End: 2025-02-19
Payer: COMMERCIAL

## 2025-02-19 ENCOUNTER — TELEPHONE (OUTPATIENT)
Dept: PRIMARY CARE | Facility: CLINIC | Age: 60
End: 2025-02-19
Payer: COMMERCIAL

## 2025-02-19 DIAGNOSIS — R21 RASH OF GROIN: ICD-10-CM

## 2025-02-19 DIAGNOSIS — I10 HTN (HYPERTENSION), BENIGN: ICD-10-CM

## 2025-02-19 RX ORDER — CARVEDILOL 12.5 MG/1
12.5 TABLET ORAL 2 TIMES DAILY
Qty: 180 TABLET | Refills: 3 | Status: SHIPPED | OUTPATIENT
Start: 2025-02-19 | End: 2025-02-19 | Stop reason: SDUPTHER

## 2025-02-19 RX ORDER — CARVEDILOL 12.5 MG/1
12.5 TABLET ORAL 2 TIMES DAILY
Qty: 180 TABLET | Refills: 3 | Status: SHIPPED | OUTPATIENT
Start: 2025-02-19

## 2025-02-19 RX ORDER — TRIAMCINOLONE ACETONIDE 1 MG/G
OINTMENT TOPICAL 2 TIMES DAILY
Qty: 20 G | Refills: 2 | Status: SHIPPED | OUTPATIENT
Start: 2025-02-19 | End: 2025-02-19 | Stop reason: SDUPTHER

## 2025-02-19 RX ORDER — TRIAMCINOLONE ACETONIDE 1 MG/G
OINTMENT TOPICAL 2 TIMES DAILY
Qty: 20 G | Refills: 2 | Status: SHIPPED | OUTPATIENT
Start: 2025-02-19

## 2025-02-19 NOTE — TELEPHONE ENCOUNTER
carvedilol (Coreg) 12.5 mg tablet// Take 1 tablet (12.5 mg) by mouth 2 times a day.    triamcinolone (Kenalog) 0.1 % ointment//Apply topically 2 times a day. APPLY SPARINGLY AND RUB IN WELL TO AFFECTED AREA TWICE DAILY    [facility] - [place], [place]

## 2025-02-24 ENCOUNTER — APPOINTMENT (OUTPATIENT)
Dept: RADIOLOGY | Facility: CLINIC | Age: 60
End: 2025-02-24
Payer: COMMERCIAL

## 2025-03-04 ENCOUNTER — TELEPHONE (OUTPATIENT)
Dept: PRIMARY CARE | Facility: CLINIC | Age: 60
End: 2025-03-04
Payer: COMMERCIAL

## 2025-03-04 DIAGNOSIS — E11.22 TYPE 2 DIABETES MELLITUS WITH STAGE 3B CHRONIC KIDNEY DISEASE, WITH LONG-TERM CURRENT USE OF INSULIN (MULTI): ICD-10-CM

## 2025-03-04 DIAGNOSIS — N18.32 TYPE 2 DIABETES MELLITUS WITH STAGE 3B CHRONIC KIDNEY DISEASE, WITH LONG-TERM CURRENT USE OF INSULIN (MULTI): ICD-10-CM

## 2025-03-04 DIAGNOSIS — Z79.4 TYPE 2 DIABETES MELLITUS WITH STAGE 3B CHRONIC KIDNEY DISEASE, WITH LONG-TERM CURRENT USE OF INSULIN (MULTI): ICD-10-CM

## 2025-03-04 NOTE — TELEPHONE ENCOUNTER
empagliflozin (Jardiance) 10 mg// Take 1 tablet (10 mg) by mouth once daily.  [facility] - [place], [place]

## 2025-04-24 ENCOUNTER — APPOINTMENT (OUTPATIENT)
Dept: PULMONOLOGY | Facility: HOSPITAL | Age: 60
End: 2025-04-24
Payer: COMMERCIAL

## 2025-05-05 ENCOUNTER — APPOINTMENT (OUTPATIENT)
Dept: PULMONOLOGY | Facility: HOSPITAL | Age: 60
End: 2025-05-05
Payer: COMMERCIAL

## 2025-05-13 ASSESSMENT — ENCOUNTER SYMPTOMS
POLYDIPSIA: 0
BLURRED VISION: 0
HEADACHES: 0
VISUAL CHANGE: 1
SEIZURES: 0
NERVOUS/ANXIOUS: 0
SWEATS: 0
DIZZINESS: 0
POLYPHAGIA: 0
CONFUSION: 0
FATIGUE: 0

## 2025-05-14 ENCOUNTER — APPOINTMENT (OUTPATIENT)
Dept: PRIMARY CARE | Facility: CLINIC | Age: 60
End: 2025-05-14
Payer: COMMERCIAL

## 2025-05-14 VITALS — HEART RATE: 68 BPM | DIASTOLIC BLOOD PRESSURE: 76 MMHG | RESPIRATION RATE: 14 BRPM | SYSTOLIC BLOOD PRESSURE: 160 MMHG

## 2025-05-14 DIAGNOSIS — I10 PRIMARY HYPERTENSION: ICD-10-CM

## 2025-05-14 DIAGNOSIS — E11.22 TYPE 2 DIABETES MELLITUS WITH STAGE 3B CHRONIC KIDNEY DISEASE, WITH LONG-TERM CURRENT USE OF INSULIN (MULTI): ICD-10-CM

## 2025-05-14 DIAGNOSIS — N18.32 TYPE 2 DIABETES MELLITUS WITH STAGE 3B CHRONIC KIDNEY DISEASE, WITH LONG-TERM CURRENT USE OF INSULIN (MULTI): ICD-10-CM

## 2025-05-14 DIAGNOSIS — E78.00 PURE HYPERCHOLESTEROLEMIA: Primary | ICD-10-CM

## 2025-05-14 DIAGNOSIS — R21 RASH OF GROIN: ICD-10-CM

## 2025-05-14 DIAGNOSIS — Z79.4 TYPE 2 DIABETES MELLITUS WITH STAGE 3B CHRONIC KIDNEY DISEASE, WITH LONG-TERM CURRENT USE OF INSULIN (MULTI): ICD-10-CM

## 2025-05-14 PROBLEM — L97.512 DIABETIC ULCER OF RIGHT FOOT ASSOCIATED WITH TYPE 2 DIABETES MELLITUS, WITH FAT LAYER EXPOSED: Status: RESOLVED | Noted: 2024-09-27 | Resolved: 2025-05-14

## 2025-05-14 PROBLEM — E11.621 DIABETIC ULCER OF RIGHT FOOT ASSOCIATED WITH TYPE 2 DIABETES MELLITUS, WITH FAT LAYER EXPOSED: Status: RESOLVED | Noted: 2024-09-27 | Resolved: 2025-05-14

## 2025-05-14 PROBLEM — N18.4 TYPE 2 DIABETES MELLITUS WITH STAGE 4 CHRONIC KIDNEY DISEASE, WITH LONG-TERM CURRENT USE OF INSULIN (MULTI): Status: RESOLVED | Noted: 2023-04-03 | Resolved: 2025-05-14

## 2025-05-14 PROBLEM — N18.4 CHRONIC RENAL DISEASE, STAGE IV (MULTI): Status: RESOLVED | Noted: 2024-08-06 | Resolved: 2025-05-14

## 2025-05-14 PROCEDURE — 99214 OFFICE O/P EST MOD 30 MIN: CPT | Performed by: FAMILY MEDICINE

## 2025-05-14 PROCEDURE — G2211 COMPLEX E/M VISIT ADD ON: HCPCS | Performed by: FAMILY MEDICINE

## 2025-05-14 PROCEDURE — 3077F SYST BP >= 140 MM HG: CPT | Performed by: FAMILY MEDICINE

## 2025-05-14 PROCEDURE — 4010F ACE/ARB THERAPY RXD/TAKEN: CPT | Performed by: FAMILY MEDICINE

## 2025-05-14 PROCEDURE — 3078F DIAST BP <80 MM HG: CPT | Performed by: FAMILY MEDICINE

## 2025-05-14 RX ORDER — INSULIN GLARGINE 300 [IU]/ML
45 INJECTION, SOLUTION SUBCUTANEOUS NIGHTLY
Qty: 15 ML | Refills: 3 | Status: SHIPPED | OUTPATIENT
Start: 2025-05-14 | End: 2026-05-14

## 2025-05-14 RX ORDER — TRIAMCINOLONE ACETONIDE 1 MG/G
OINTMENT TOPICAL 2 TIMES DAILY
Qty: 20 G | Refills: 2 | Status: SHIPPED | OUTPATIENT
Start: 2025-05-14

## 2025-05-14 RX ORDER — LISINOPRIL 40 MG/1
40 TABLET ORAL DAILY
Qty: 90 TABLET | Refills: 3 | Status: SHIPPED | OUTPATIENT
Start: 2025-05-14

## 2025-05-14 ASSESSMENT — PATIENT HEALTH QUESTIONNAIRE - PHQ9
SUM OF ALL RESPONSES TO PHQ9 QUESTIONS 1 AND 2: 0
1. LITTLE INTEREST OR PLEASURE IN DOING THINGS: NOT AT ALL
2. FEELING DOWN, DEPRESSED OR HOPELESS: NOT AT ALL

## 2025-05-14 NOTE — ASSESSMENT & PLAN NOTE
BP has not been controlled. Increase lisinopril to 40mg qd. keep a daily  bp log and bring in the log at the next office visit. Call office if BP is persistently over 130/80. DASH diet and regular exercise. Decrease calorie intake to lose wt.   Fu in 1 mos      Orders:    Lipid Panel; Future    Comprehensive Metabolic Panel; Future    Hemoglobin A1C; Future    CBC; Future    Albumin-Creatinine Ratio, Urine Random; Future

## 2025-05-14 NOTE — ASSESSMENT & PLAN NOTE
Hyperlipidemia on zetia and  statin. No GI upset or muscle ache. Will monitor labs for evaluation.  Health diet and regular exercise. Decrease calorie intake to lose wt.  f/u in 3 mos.     Orders:    Lipid Panel; Future    Comprehensive Metabolic Panel; Future    Hemoglobin A1C; Future    CBC; Future    Albumin-Creatinine Ratio, Urine Random; Future

## 2025-05-14 NOTE — PROGRESS NOTES
Subjective   Patient ID: Parth Rodríguez is a 59 y.o. male who presents for fu    HPI   Patient has been compliant with taking all  current medications except insulin in the past 21 days. Lantus caused itches at abd skin. FBG has been at 200's most days. No hypoglycemic episodes. No polydipsia, polyuria or significant weight changes. No  lower extremities skin lesion. stable LE numbness and  tingling. No blurry vision. No GI upset  or muscle ache while on zetia and statin for high lipids. Normal appetite. No CP, HA, dizziness, heart palpitation. No claudication or cold LE.  stable LE edema. No imbalance or falls. Good mood.  Intermittent rash was controlled with current cream prn  Review of Systems    Objective   /76   Pulse 68   Resp 14     Physical Exam  NAD, well groomed, No sclera icterus. lungs: CTA b/l, heart: RRR, mild  LE edema, normal pedal pulses, abd: soft, no tenderness, BS+, normal strength but  decreased sensation  at bilateral lower extremities. No skin lesions at bilateral feet.  Good balance. CNII-XII were grossly intact, good judgment and memory. No depressed mood.   Assessment/Plan   Assessment & Plan  Type 2 diabetes mellitus with stage 3b chronic kidney disease, with long-term current use of insulin (Multi)  DMII, not  controlled. Lantus caused rashes. Change to basaglar. Increase dose of jardiance to 25mg every day. Continue other  medications. Will monitor A1C, urine albumin. advise eye exam by an OD yearly and checking bilateral feet for skin lesions qhs. Healthy diet and regular exercise. Decrease calorie intake to lose wt.      Orders:    empagliflozin (Jardiance) 25 mg tablet; Take 1 tablet (25 mg) by mouth once daily.    Lipid Panel; Future    Comprehensive Metabolic Panel; Future    Hemoglobin A1C; Future    CBC; Future    lisinopril 40 mg tablet; Take 1 tablet (40 mg) by mouth once daily.    Albumin-Creatinine Ratio, Urine Random; Future    insulin glargine (Toujeo Max U-300  SoloStar) 300 unit/mL (3 mL) pen; Inject 46 Units under the skin once daily at bedtime. Take as directed per insulin instructions.    Referral to Endocrinology; Future    Pure hypercholesterolemia  Hyperlipidemia on zetia and  statin. No GI upset or muscle ache. Will monitor labs for evaluation.  Health diet and regular exercise. Decrease calorie intake to lose wt.  f/u in 3 mos.     Orders:    Lipid Panel; Future    Comprehensive Metabolic Panel; Future    Hemoglobin A1C; Future    CBC; Future    Albumin-Creatinine Ratio, Urine Random; Future    Primary hypertension  BP has not been controlled. Increase lisinopril to 40mg qd. keep a daily  bp log and bring in the log at the next office visit. Call office if BP is persistently over 130/80. DASH diet and regular exercise. Decrease calorie intake to lose wt.   Fu in 1 mos      Orders:    Lipid Panel; Future    Comprehensive Metabolic Panel; Future    Hemoglobin A1C; Future    CBC; Future    Albumin-Creatinine Ratio, Urine Random; Future    Rash of groin  Controlled. Cont the same  Orders:    triamcinolone (Kenalog) 0.1 % ointment; Apply topically 2 times a day. APPLY SPARINGLY AND RUB IN WELL TO AFFECTED AREA TWICE DAILY

## 2025-05-14 NOTE — ASSESSMENT & PLAN NOTE
Controlled. Cont the same  Orders:    triamcinolone (Kenalog) 0.1 % ointment; Apply topically 2 times a day. APPLY SPARINGLY AND RUB IN WELL TO AFFECTED AREA TWICE DAILY

## 2025-05-14 NOTE — ASSESSMENT & PLAN NOTE
DMII, not  controlled. Lantus caused rashes. Change to basaglar. Increase dose of jardiance to 25mg every day. Continue other  medications. Will monitor A1C, urine albumin. advise eye exam by an OD yearly and checking bilateral feet for skin lesions qhs. Healthy diet and regular exercise. Decrease calorie intake to lose wt.      Orders:    empagliflozin (Jardiance) 25 mg tablet; Take 1 tablet (25 mg) by mouth once daily.    Lipid Panel; Future    Comprehensive Metabolic Panel; Future    Hemoglobin A1C; Future    CBC; Future    lisinopril 40 mg tablet; Take 1 tablet (40 mg) by mouth once daily.    Albumin-Creatinine Ratio, Urine Random; Future    insulin glargine (Toujeo Max U-300 SoloStar) 300 unit/mL (3 mL) pen; Inject 46 Units under the skin once daily at bedtime. Take as directed per insulin instructions.    Referral to Endocrinology; Future

## 2025-05-24 LAB
ALBUMIN SERPL-MCNC: 4.1 G/DL (ref 3.6–5.1)
ALBUMIN/CREAT UR: NORMAL
ALP SERPL-CCNC: 123 U/L (ref 35–144)
ALT SERPL-CCNC: 9 U/L (ref 9–46)
ANION GAP SERPL CALCULATED.4IONS-SCNC: 8 MMOL/L (CALC) (ref 7–17)
AST SERPL-CCNC: 13 U/L (ref 10–35)
BILIRUB SERPL-MCNC: 0.3 MG/DL (ref 0.2–1.2)
BUN SERPL-MCNC: 51 MG/DL (ref 7–25)
CALCIUM SERPL-MCNC: 8.7 MG/DL (ref 8.6–10.3)
CHLORIDE SERPL-SCNC: 105 MMOL/L (ref 98–110)
CHOLEST SERPL-MCNC: 97 MG/DL
CHOLEST/HDLC SERPL: 3.5 (CALC)
CO2 SERPL-SCNC: 24 MMOL/L (ref 20–32)
CREAT SERPL-MCNC: 2.59 MG/DL (ref 0.7–1.35)
CREAT UR-MCNC: NORMAL MG/DL
EGFRCR SERPLBLD CKD-EPI 2021: 27 ML/MIN/1.73M2
ERYTHROCYTE [DISTWIDTH] IN BLOOD BY AUTOMATED COUNT: 13.3 % (ref 11–15)
EST. AVERAGE GLUCOSE BLD GHB EST-MCNC: 192 MG/DL
EST. AVERAGE GLUCOSE BLD GHB EST-SCNC: 10.6 MMOL/L
GLUCOSE SERPL-MCNC: 100 MG/DL (ref 65–99)
HBA1C MFR BLD: 8.3 %
HCT VFR BLD AUTO: 36.8 % (ref 38.5–50)
HDLC SERPL-MCNC: 28 MG/DL
HGB BLD-MCNC: 11.7 G/DL (ref 13.2–17.1)
LDLC SERPL CALC-MCNC: 44 MG/DL (CALC)
MCH RBC QN AUTO: 30.3 PG (ref 27–33)
MCHC RBC AUTO-ENTMCNC: 31.8 G/DL (ref 32–36)
MCV RBC AUTO: 95.3 FL (ref 80–100)
MICROALBUMIN UR-MCNC: NORMAL
NONHDLC SERPL-MCNC: 69 MG/DL (CALC)
PLATELET # BLD AUTO: 235 THOUSAND/UL (ref 140–400)
PMV BLD REES-ECKER: 10.9 FL (ref 7.5–12.5)
POTASSIUM SERPL-SCNC: 5.4 MMOL/L (ref 3.5–5.3)
PROT SERPL-MCNC: 6.7 G/DL (ref 6.1–8.1)
RBC # BLD AUTO: 3.86 MILLION/UL (ref 4.2–5.8)
SODIUM SERPL-SCNC: 137 MMOL/L (ref 135–146)
TRIGL SERPL-MCNC: 169 MG/DL
WBC # BLD AUTO: 8.6 THOUSAND/UL (ref 3.8–10.8)

## 2025-05-27 LAB
ALBUMIN SERPL-MCNC: 4.1 G/DL (ref 3.6–5.1)
ALBUMIN/CREAT UR: 29 MG/G CREAT
ALP SERPL-CCNC: 123 U/L (ref 35–144)
ALT SERPL-CCNC: 9 U/L (ref 9–46)
ANION GAP SERPL CALCULATED.4IONS-SCNC: 8 MMOL/L (CALC) (ref 7–17)
AST SERPL-CCNC: 13 U/L (ref 10–35)
BILIRUB SERPL-MCNC: 0.3 MG/DL (ref 0.2–1.2)
BUN SERPL-MCNC: 51 MG/DL (ref 7–25)
CALCIUM SERPL-MCNC: 8.7 MG/DL (ref 8.6–10.3)
CHLORIDE SERPL-SCNC: 105 MMOL/L (ref 98–110)
CHOLEST SERPL-MCNC: 97 MG/DL
CHOLEST/HDLC SERPL: 3.5 (CALC)
CO2 SERPL-SCNC: 24 MMOL/L (ref 20–32)
CREAT SERPL-MCNC: 2.59 MG/DL (ref 0.7–1.35)
CREAT UR-MCNC: 84 MG/DL (ref 20–320)
EGFRCR SERPLBLD CKD-EPI 2021: 27 ML/MIN/1.73M2
ERYTHROCYTE [DISTWIDTH] IN BLOOD BY AUTOMATED COUNT: 13.3 % (ref 11–15)
EST. AVERAGE GLUCOSE BLD GHB EST-MCNC: 192 MG/DL
EST. AVERAGE GLUCOSE BLD GHB EST-SCNC: 10.6 MMOL/L
GLUCOSE SERPL-MCNC: 100 MG/DL (ref 65–99)
HBA1C MFR BLD: 8.3 %
HCT VFR BLD AUTO: 36.8 % (ref 38.5–50)
HDLC SERPL-MCNC: 28 MG/DL
HGB BLD-MCNC: 11.7 G/DL (ref 13.2–17.1)
LDLC SERPL CALC-MCNC: 44 MG/DL (CALC)
MCH RBC QN AUTO: 30.3 PG (ref 27–33)
MCHC RBC AUTO-ENTMCNC: 31.8 G/DL (ref 32–36)
MCV RBC AUTO: 95.3 FL (ref 80–100)
MICROALBUMIN UR-MCNC: 2.4 MG/DL
NONHDLC SERPL-MCNC: 69 MG/DL (CALC)
PLATELET # BLD AUTO: 235 THOUSAND/UL (ref 140–400)
PMV BLD REES-ECKER: 10.9 FL (ref 7.5–12.5)
POTASSIUM SERPL-SCNC: 5.4 MMOL/L (ref 3.5–5.3)
PROT SERPL-MCNC: 6.7 G/DL (ref 6.1–8.1)
RBC # BLD AUTO: 3.86 MILLION/UL (ref 4.2–5.8)
SODIUM SERPL-SCNC: 137 MMOL/L (ref 135–146)
TRIGL SERPL-MCNC: 169 MG/DL
WBC # BLD AUTO: 8.6 THOUSAND/UL (ref 3.8–10.8)

## 2025-06-02 NOTE — PROGRESS NOTES
Patient is sent at the request of Marivel Hull MD PhD for my opinion regarding Type 2 diabetes.  My final recommendations will be communicated back to the requesting provider by way of shared medical record.    Subjective   Parth Rodríguez is a 59 y.o. male who presents for initial visit for evaluation of Type 2 diabetes mellitus.     He sees nephrology tomorrow.  He was prescribed a new medication for hyperkalemia.     He has been experiencing more lows in the last calendar year in 25 years.  He travels frequently and plays poker.  He does not eat as frewquently.      He lives alone.    Known complications due to diabetes included CAD s/p 3 stents and chronic kidney disease    Cardiovascular risk factors include advanced age (older than 55 for men, 65 for women), diabetes mellitus, male gender, and obesity (BMI >= 30 kg/m2). The patient is on an ACE inhibitor or angiotensin II receptor blocker.  The patient has not been previously hospitalized due to diabetic ketoacidosis.     Current symptoms/problems include hypoglycemia . His clinical course has been stable.     Current diabetes regimen is as follows:   Jardiance 25mg once daily   Glimeperdie 4mg twice daily   Toujeo 40 units subcutaneous bedtime; he decreased one week ago     The patient is currently checking the blood glucose.    Patient is using: glucometer    Hypoglycemia frequency: 40s; in the middle of the night   Hypoglycemia awareness: Yes     MEALS:  Breakfast - oatmeal, egg  Lunch -  fish/tuna, vegetable, salad  Dinner - as above   Snacks - popcorn, fruit, vegetables, melons, canteloups, grapes, cucumbers, celery  Beverages - water, coffee      Review of Systems   Constitutional:  Positive for unexpected weight change (Weight gain).   Respiratory:  Positive for shortness of breath.    Cardiovascular:  Negative for chest pain.   Gastrointestinal:  Positive for constipation.   Musculoskeletal:  Positive for back pain.   Neurological:  Positive for  "dizziness.       Objective   /72   Pulse 89   Ht 1.854 m (6' 1\")   Wt 105 kg (231 lb)   BMI 30.48 kg/m²   Physical Exam  Vitals and nursing note reviewed.   Constitutional:       General: He is not in acute distress.     Appearance: Normal appearance. He is normal weight.   HENT:      Head: Normocephalic and atraumatic.      Nose: Nose normal.      Mouth/Throat:      Mouth: Mucous membranes are moist.   Eyes:      Extraocular Movements: Extraocular movements intact.   Pulmonary:      Effort: Pulmonary effort is normal.   Musculoskeletal:         General: Normal range of motion.   Neurological:      Mental Status: He is alert and oriented to person, place, and time.   Psychiatric:         Mood and Affect: Mood normal.         Lab Review  GLUCOSE (mg/dL)   Date Value   05/23/2025 100 (H)     Glucose (mg/dL)   Date Value   12/17/2024 73 (L)   11/08/2024 197 (H)   08/02/2024 105 (H)     HEMOGLOBIN A1c (%)   Date Value   05/23/2025 8.3 (H)     Hemoglobin A1C (%)   Date Value   12/20/2024 9.1 (H)   11/08/2024 10.7 (H)   08/02/2024 8.7 (H)     CARBON DIOXIDE (mmol/L)   Date Value   05/23/2025 24     Bicarbonate (mmol/L)   Date Value   12/17/2024 25   11/08/2024 27   08/02/2024 23     UREA NITROGEN (BUN) (mg/dL)   Date Value   05/23/2025 51 (H)     Urea Nitrogen (mg/dL)   Date Value   12/17/2024 38 (H)   11/08/2024 51 (H)   08/02/2024 60 (H)     Creatinine (mg/dL)   Date Value   12/17/2024 2.03 (H)   11/08/2024 1.89 (H)   08/02/2024 2.47 (H)     CREATININE (mg/dL)   Date Value   05/23/2025 2.59 (H)     Lab Results   Component Value Date    CHOL 97 05/23/2025    CHOL 115 08/02/2024    CHOL 108 01/10/2024     Lab Results   Component Value Date    HDL 28 (L) 05/23/2025    HDL 30.5 08/02/2024    HDL 31.3 01/10/2024     Lab Results   Component Value Date    LDLCALC 44 05/23/2025    LDLCALC 55 08/02/2024    LDLCALC 42 01/10/2024     Lab Results   Component Value Date    TRIG 169 (H) 05/23/2025    TRIG 148 08/02/2024    " TRIG 173 (H) 01/10/2024       Health Maintenance:   Foot Exam:   Eye Exam:  Urine Albumin:   May 23, 2025    Assessment/Plan   59-year-old male presents for the evaluation of for the management of type 2 diabetes mellitus.  His blood pressure is at goal.  He has been plagued by increasing episodes of hypoglycemia.  His GFR is noted to be decreased to stage IV.    Type 2 diabetes mellitus, with long-term current use of insulin  To continue Jardiance 25mg once daily   To decrease Glimeperdie to 2mg twice daily before meals   To continue Toujeo 40 units subcutaneous bedtime  Counseled that the clearance of the above medications will be hindered by diminishing kidney function and this leading to increased episodes of hypoglycemia  To commence the use of a CGM for the intensive glucose monitoring due to the dynamic nature of insulin requirements, the narrow therapeutic index of insulin and the potentially fatal consequences of treatment  Counseled that the goal A1C should be 7% or less  Counseled glycemic control is warranted to prevent microvascular complications  For clinical pharmacy referral for CGM review in 2 weeks   Nutrition referral   For follow up in 3-4 months

## 2025-06-02 NOTE — PATIENT INSTRUCTIONS
Thank you for choosing [facility]  for your health care needs.  If you have any questions, concerns or medical needs, please feel free to contact our office at [phone].    Please ensure you complete your blood work one week before the next scheduled appointment.    To continue Jardiance 25mg once daily   To decrease Glimeperdie to 2mg twice daily before meals   To continue Toujeo 40 units subcutaneous bedtime  To commence the use of a CGM for the intensive glucose monitoring due to the dynamic nature of insulin requirements, the narrow therapeutic index of insulin and the potentially fatal consequences of treatment  Counseled that the goal A1C should be 7% or less  Counseled glycemic control is warranted to prevent microvascular complications  For clinical pharmacy referral for CGM review in 2 weeks   Nutrition referral   For follow up in 3-4 months

## 2025-06-03 ENCOUNTER — APPOINTMENT (OUTPATIENT)
Dept: ENDOCRINOLOGY | Facility: CLINIC | Age: 60
End: 2025-06-03
Payer: MEDICARE

## 2025-06-03 VITALS
SYSTOLIC BLOOD PRESSURE: 128 MMHG | BODY MASS INDEX: 30.62 KG/M2 | DIASTOLIC BLOOD PRESSURE: 72 MMHG | HEART RATE: 89 BPM | WEIGHT: 231 LBS | HEIGHT: 73 IN

## 2025-06-03 DIAGNOSIS — Z79.4 TYPE 2 DIABETES MELLITUS WITH STAGE 4 CHRONIC KIDNEY DISEASE, WITH LONG-TERM CURRENT USE OF INSULIN (MULTI): ICD-10-CM

## 2025-06-03 DIAGNOSIS — N18.32 TYPE 2 DIABETES MELLITUS WITH STAGE 3B CHRONIC KIDNEY DISEASE, WITH LONG-TERM CURRENT USE OF INSULIN (MULTI): ICD-10-CM

## 2025-06-03 DIAGNOSIS — E11.22 TYPE 2 DIABETES MELLITUS WITH STAGE 3B CHRONIC KIDNEY DISEASE, WITH LONG-TERM CURRENT USE OF INSULIN (MULTI): ICD-10-CM

## 2025-06-03 DIAGNOSIS — N18.4 TYPE 2 DIABETES MELLITUS WITH STAGE 4 CHRONIC KIDNEY DISEASE, WITH LONG-TERM CURRENT USE OF INSULIN (MULTI): ICD-10-CM

## 2025-06-03 DIAGNOSIS — E11.22 TYPE 2 DIABETES MELLITUS WITH STAGE 4 CHRONIC KIDNEY DISEASE, WITH LONG-TERM CURRENT USE OF INSULIN (MULTI): ICD-10-CM

## 2025-06-03 DIAGNOSIS — Z79.4 TYPE 2 DIABETES MELLITUS WITH STAGE 3B CHRONIC KIDNEY DISEASE, WITH LONG-TERM CURRENT USE OF INSULIN (MULTI): ICD-10-CM

## 2025-06-03 PROCEDURE — 3008F BODY MASS INDEX DOCD: CPT | Performed by: INTERNAL MEDICINE

## 2025-06-03 PROCEDURE — 3074F SYST BP LT 130 MM HG: CPT | Performed by: INTERNAL MEDICINE

## 2025-06-03 PROCEDURE — 4010F ACE/ARB THERAPY RXD/TAKEN: CPT | Performed by: INTERNAL MEDICINE

## 2025-06-03 PROCEDURE — G2211 COMPLEX E/M VISIT ADD ON: HCPCS | Performed by: INTERNAL MEDICINE

## 2025-06-03 PROCEDURE — 3078F DIAST BP <80 MM HG: CPT | Performed by: INTERNAL MEDICINE

## 2025-06-03 PROCEDURE — 99204 OFFICE O/P NEW MOD 45 MIN: CPT | Performed by: INTERNAL MEDICINE

## 2025-06-03 RX ORDER — SODIUM ZIRCONIUM CYCLOSILICATE 10 G/10G
POWDER, FOR SUSPENSION ORAL EVERY 24 HOURS
COMMUNITY
Start: 2025-06-02

## 2025-06-03 ASSESSMENT — ENCOUNTER SYMPTOMS
DIZZINESS: 1
SHORTNESS OF BREATH: 1

## 2025-06-03 NOTE — LETTER
June 8, 2025     Marivel Hull MD PhD  [address] [facility] [address]    Patient: Parth Rodríguez   YOB: 1965   Date of Visit: 6/3/2025       Dear Dr. Marivel Hull MD PhD:    Thank you for referring Parth Rodríguez to me for evaluation. Below are my notes for this consultation.  If you have questions, please do not hesitate to call me. I look forward to following your patient along with you.       Sincerely,     Jazmin Pearson MD      CC: No Recipients  ______________________________________________________________________________________    Patient is sent at the request of Marivel Hull MD PhD for my opinion regarding Type 2 diabetes.  My final recommendations will be communicated back to the requesting provider by way of shared medical record.    Subjective   Parth Rodríguez is a 59 y.o. male who presents for initial visit for evaluation of Type 2 diabetes mellitus.     He sees nephrology tomorrow.  He was prescribed a new medication for hyperkalemia.     He has been experiencing more lows in the last calendar year in 25 years.  He travels frequently and plays poker.  He does not eat as frewquently.      He lives alone.    Known complications due to diabetes included CAD s/p 3 stents and chronic kidney disease    Cardiovascular risk factors include advanced age (older than 55 for men, 65 for women), diabetes mellitus, male gender, and obesity (BMI >= 30 kg/m2). The patient is on an ACE inhibitor or angiotensin II receptor blocker.  The patient has not been previously hospitalized due to diabetic ketoacidosis.     Current symptoms/problems include hypoglycemia . His clinical course has been stable.     Current diabetes regimen is as follows:   Jardiance 25mg once daily   Glimeperdie 4mg twice daily   Toujeo 40 units subcutaneous bedtime; he decreased one week ago     The patient is currently checking the blood glucose.    Patient is using: glucometer    Hypoglycemia frequency: 40s;  "in the middle of the night   Hypoglycemia awareness: Yes     MEALS:  Breakfast - oatmeal, egg  Lunch -  fish/tuna, vegetable, salad  Dinner - as above   Snacks - popcorn, fruit, vegetables, melons, canteloups, grapes, cucumbers, celery  Beverages - water, coffee      Review of Systems   Constitutional:  Positive for unexpected weight change (Weight gain).   Respiratory:  Positive for shortness of breath.    Cardiovascular:  Negative for chest pain.   Gastrointestinal:  Positive for constipation.   Musculoskeletal:  Positive for back pain.   Neurological:  Positive for dizziness.       Objective   /72   Pulse 89   Ht 1.854 m (6' 1\")   Wt 105 kg (231 lb)   BMI 30.48 kg/m²   Physical Exam  Vitals and nursing note reviewed.   Constitutional:       General: He is not in acute distress.     Appearance: Normal appearance. He is normal weight.   HENT:      Head: Normocephalic and atraumatic.      Nose: Nose normal.      Mouth/Throat:      Mouth: Mucous membranes are moist.   Eyes:      Extraocular Movements: Extraocular movements intact.   Pulmonary:      Effort: Pulmonary effort is normal.   Musculoskeletal:         General: Normal range of motion.   Neurological:      Mental Status: He is alert and oriented to person, place, and time.   Psychiatric:         Mood and Affect: Mood normal.         Lab Review  GLUCOSE (mg/dL)   Date Value   05/23/2025 100 (H)     Glucose (mg/dL)   Date Value   12/17/2024 73 (L)   11/08/2024 197 (H)   08/02/2024 105 (H)     HEMOGLOBIN A1c (%)   Date Value   05/23/2025 8.3 (H)     Hemoglobin A1C (%)   Date Value   12/20/2024 9.1 (H)   11/08/2024 10.7 (H)   08/02/2024 8.7 (H)     CARBON DIOXIDE (mmol/L)   Date Value   05/23/2025 24     Bicarbonate (mmol/L)   Date Value   12/17/2024 25   11/08/2024 27   08/02/2024 23     UREA NITROGEN (BUN) (mg/dL)   Date Value   05/23/2025 51 (H)     Urea Nitrogen (mg/dL)   Date Value   12/17/2024 38 (H)   11/08/2024 51 (H)   08/02/2024 60 (H) "     Creatinine (mg/dL)   Date Value   12/17/2024 2.03 (H)   11/08/2024 1.89 (H)   08/02/2024 2.47 (H)     CREATININE (mg/dL)   Date Value   05/23/2025 2.59 (H)     Lab Results   Component Value Date    CHOL 97 05/23/2025    CHOL 115 08/02/2024    CHOL 108 01/10/2024     Lab Results   Component Value Date    HDL 28 (L) 05/23/2025    HDL 30.5 08/02/2024    HDL 31.3 01/10/2024     Lab Results   Component Value Date    LDLCALC 44 05/23/2025    LDLCALC 55 08/02/2024    LDLCALC 42 01/10/2024     Lab Results   Component Value Date    TRIG 169 (H) 05/23/2025    TRIG 148 08/02/2024    TRIG 173 (H) 01/10/2024       Health Maintenance:   Foot Exam:   Eye Exam:  Urine Albumin:   May 23, 2025    Assessment/Plan   59-year-old male presents for the evaluation of for the management of type 2 diabetes mellitus.  His blood pressure is at goal.  He has been plagued by increasing episodes of hypoglycemia.  His GFR is noted to be decreased to stage IV.    Type 2 diabetes mellitus, with long-term current use of insulin  To continue Jardiance 25mg once daily   To decrease Glimeperdie to 2mg twice daily before meals   To continue Toujeo 40 units subcutaneous bedtime  Counseled that the clearance of the above medications will be hindered by diminishing kidney function and this leading to increased episodes of hypoglycemia  To commence the use of a CGM for the intensive glucose monitoring due to the dynamic nature of insulin requirements, the narrow therapeutic index of insulin and the potentially fatal consequences of treatment  Counseled that the goal A1C should be 7% or less  Counseled glycemic control is warranted to prevent microvascular complications  For clinical pharmacy referral for CGM review in 2 weeks   Nutrition referral   For follow up in 3-4 months

## 2025-06-04 ASSESSMENT — ENCOUNTER SYMPTOMS
UNEXPECTED WEIGHT CHANGE: 1
CONSTIPATION: 1
BACK PAIN: 1

## 2025-06-08 PROBLEM — Z79.4 TYPE 2 DIABETES MELLITUS, WITH LONG-TERM CURRENT USE OF INSULIN: Status: ACTIVE | Noted: 2022-04-28

## 2025-06-08 RX ORDER — INSULIN GLARGINE 300 [IU]/ML
40 INJECTION, SOLUTION SUBCUTANEOUS NIGHTLY
Qty: 4 ML | Refills: 5 | Status: SHIPPED | OUTPATIENT
Start: 2025-06-08 | End: 2025-12-05

## 2025-06-08 RX ORDER — BLOOD-GLUCOSE SENSOR
EACH MISCELLANEOUS
Qty: 2 EACH | Refills: 11 | Status: SHIPPED | OUTPATIENT
Start: 2025-06-08

## 2025-06-08 RX ORDER — GLIMEPIRIDE 2 MG/1
2 TABLET ORAL 2 TIMES DAILY
Qty: 180 TABLET | Refills: 1 | Status: SHIPPED | OUTPATIENT
Start: 2025-06-08 | End: 2025-12-05

## 2025-06-08 NOTE — ASSESSMENT & PLAN NOTE
To continue Jardiance 25mg once daily   To decrease Glimeperdie to 2mg twice daily before meals   To continue Toujeo 40 units subcutaneous bedtime  Counseled that the clearance of the above medications will be hindered by diminishing kidney function and this leading to increased episodes of hypoglycemia  To commence the use of a CGM for the intensive glucose monitoring due to the dynamic nature of insulin requirements, the narrow therapeutic index of insulin and the potentially fatal consequences of treatment  Counseled that the goal A1C should be 7% or less  Counseled glycemic control is warranted to prevent microvascular complications  For clinical pharmacy referral for CGM review in 2 weeks   Nutrition referral   For follow up in 3-4 months

## 2025-06-11 ENCOUNTER — PATIENT OUTREACH (OUTPATIENT)
Dept: CARE COORDINATION | Facility: CLINIC | Age: 60
End: 2025-06-11
Payer: MEDICARE

## 2025-06-11 NOTE — PROGRESS NOTES
Spoke with patient, on this date, in regard to setting up an appointment for diabetes education, however, patient declined these services, at this time. As such, Diabetes Management Program will be closed.

## 2025-06-13 ENCOUNTER — APPOINTMENT (OUTPATIENT)
Dept: PRIMARY CARE | Facility: CLINIC | Age: 60
End: 2025-06-13
Payer: MEDICARE

## 2025-06-13 VITALS — DIASTOLIC BLOOD PRESSURE: 75 MMHG | SYSTOLIC BLOOD PRESSURE: 125 MMHG

## 2025-06-13 DIAGNOSIS — I10 PRIMARY HYPERTENSION: Primary | ICD-10-CM

## 2025-06-13 PROCEDURE — 3074F SYST BP LT 130 MM HG: CPT | Performed by: FAMILY MEDICINE

## 2025-06-13 PROCEDURE — 4004F PT TOBACCO SCREEN RCVD TLK: CPT | Performed by: FAMILY MEDICINE

## 2025-06-13 PROCEDURE — 4010F ACE/ARB THERAPY RXD/TAKEN: CPT | Performed by: FAMILY MEDICINE

## 2025-06-13 PROCEDURE — 3078F DIAST BP <80 MM HG: CPT | Performed by: FAMILY MEDICINE

## 2025-06-13 PROCEDURE — 99213 OFFICE O/P EST LOW 20 MIN: CPT | Performed by: FAMILY MEDICINE

## 2025-06-13 PROCEDURE — G2211 COMPLEX E/M VISIT ADD ON: HCPCS | Performed by: FAMILY MEDICINE

## 2025-06-13 NOTE — PROGRESS NOTES
Subjective   Patient ID: Parth Rodríguez is a 59 y.o. male who presents for fu    HPI   Patient has been compliant with taking all  current medications. Pt had hypoglycemic episodes at mid night. No polydipsia, polyuria or significant weight changes. No CP,  heart palpitation. No  LE edema. No imbalance or falls. Good mood.     Review of Systems    Objective   /75     Physical Exam  NAD, well groomed, No sclera icterus.lungs: CTA b/l, heart: RRR, No LE edema,  abd: soft, no tenderness, BS+, Good balance. CNII-XII were grossly intact, good mood Assessment/Plan   Assessment & Plan  Primary hypertension  BP has been controlled. Continue BP pills. keep a daily  bp log and bring in the log at the next office visit. Call office if BP is persistently over 130/80. DASH diet and regular exercise. Decrease calorie intake to lose wt.

## 2025-06-20 ENCOUNTER — APPOINTMENT (OUTPATIENT)
Dept: ENDOCRINOLOGY | Facility: CLINIC | Age: 60
End: 2025-06-20
Payer: MEDICARE

## 2025-06-20 DIAGNOSIS — Z79.4 TYPE 2 DIABETES MELLITUS WITH STAGE 4 CHRONIC KIDNEY DISEASE, WITH LONG-TERM CURRENT USE OF INSULIN (MULTI): ICD-10-CM

## 2025-06-20 DIAGNOSIS — N18.4 TYPE 2 DIABETES MELLITUS WITH STAGE 4 CHRONIC KIDNEY DISEASE, WITH LONG-TERM CURRENT USE OF INSULIN (MULTI): ICD-10-CM

## 2025-06-20 DIAGNOSIS — E11.22 TYPE 2 DIABETES MELLITUS WITH STAGE 4 CHRONIC KIDNEY DISEASE, WITH LONG-TERM CURRENT USE OF INSULIN (MULTI): ICD-10-CM

## 2025-06-20 LAB
25(OH)D3+25(OH)D2 SERPL-MCNC: 37 NG/ML (ref 30–100)
ALBUMIN SERPL-MCNC: 3.9 G/DL (ref 3.6–5.1)
BASOPHILS # BLD AUTO: 68 CELLS/UL (ref 0–200)
BASOPHILS NFR BLD AUTO: 0.8 %
BUN SERPL-MCNC: 48 MG/DL (ref 7–25)
BUN/CREAT SERPL: 22 (CALC) (ref 6–22)
CALCIUM SERPL-MCNC: 8.2 MG/DL (ref 8.6–10.3)
CHLORIDE SERPL-SCNC: 104 MMOL/L (ref 98–110)
CO2 SERPL-SCNC: 22 MMOL/L (ref 20–32)
CREAT SERPL-MCNC: 2.22 MG/DL (ref 0.7–1.35)
CREAT UR-MCNC: 70 MG/DL (ref 20–320)
EGFRCR SERPLBLD CKD-EPI 2021: 33 ML/MIN/1.73M2
EOSINOPHIL # BLD AUTO: 332 CELLS/UL (ref 15–500)
EOSINOPHIL NFR BLD AUTO: 3.9 %
ERYTHROCYTE [DISTWIDTH] IN BLOOD BY AUTOMATED COUNT: 12.9 % (ref 11–15)
GLUCOSE SERPL-MCNC: 160 MG/DL (ref 65–139)
HCT VFR BLD AUTO: 35.5 % (ref 38.5–50)
HGB BLD-MCNC: 11.3 G/DL (ref 13.2–17.1)
LYMPHOCYTES # BLD AUTO: 1904 CELLS/UL (ref 850–3900)
LYMPHOCYTES NFR BLD AUTO: 22.4 %
MCH RBC QN AUTO: 30.3 PG (ref 27–33)
MCHC RBC AUTO-ENTMCNC: 31.8 G/DL (ref 32–36)
MCV RBC AUTO: 95.2 FL (ref 80–100)
MONOCYTES # BLD AUTO: 765 CELLS/UL (ref 200–950)
MONOCYTES NFR BLD AUTO: 9 %
NEUTROPHILS # BLD AUTO: 5432 CELLS/UL (ref 1500–7800)
NEUTROPHILS NFR BLD AUTO: 63.9 %
PHOSPHATE SERPL-MCNC: 5 MG/DL (ref 2.5–4.5)
PLATELET # BLD AUTO: 238 THOUSAND/UL (ref 140–400)
PMV BLD REES-ECKER: 10.8 FL (ref 7.5–12.5)
POTASSIUM SERPL-SCNC: 5.1 MMOL/L (ref 3.5–5.3)
PROT UR-MCNC: 23 MG/DL (ref 5–25)
PROT/CREAT UR: 0.33 MG/MG CREAT (ref 0.03–0.15)
PROT/CREAT UR: 329 MG/G CREAT (ref 25–148)
PTH-INTACT SERPL-MCNC: 370 PG/ML (ref 16–77)
RBC # BLD AUTO: 3.73 MILLION/UL (ref 4.2–5.8)
SODIUM SERPL-SCNC: 137 MMOL/L (ref 135–146)
WBC # BLD AUTO: 8.5 THOUSAND/UL (ref 3.8–10.8)

## 2025-06-20 RX ORDER — SEMAGLUTIDE 0.68 MG/ML
INJECTION, SOLUTION SUBCUTANEOUS
Qty: 3 ML | Refills: 1 | Status: SHIPPED | OUTPATIENT
Start: 2025-06-20 | End: 2025-08-15

## 2025-06-20 NOTE — PROGRESS NOTES
"Patient is sent at the request of Stoney Yang* for my opinion regarding diabetes.  My recommendations below will be communicated back to the requesting provider by way of shared medical record.    Recommendations:   Start Ozempic 0.25mg weekly  Decided benefits of starting GLP1>risks  Will make nephrologist aware of medication change for close monitoring  When starting Ozempic, stop glimepiride, reduce Toujeo to 30 units at bedtime  Follow-up in 3 weeks via phone  ________________________________________________________________________    Subjective   Past Medical History:  Problem List[1]      HPI:  Parth Rodríguez is a 59 y.o. male with a PMH significant for coronary artery stent placement, fatty liver, T2DM, HTN, renal insufficiency   Pt presents today for new patient visit with endo PharmD for Type 2 Diabetes Mellitus  Last seen by Dr. Cruz Pearson MD on 6/3/25 where patient reported lows to the 40s overnight. Glimepiride was cut in half.    Today:   Reduced glimepiride. Still having lows. His daughters are worried about him  New monitoring system (audrey). Only a couple highs. 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 Used to be a , so knows how to prepare food  Referred to dietician before  Greens, peas, fish. Knows how to prepare and eat  Ozempic  No FH MENS2, thyroid cancer  Appetite good, no malnutrition  BM- hard stools. Drastically changed in last 6 weeks. Has been a lot better  Sensor came off in shower Mon morning. Daughter bringing new sensors over today    Diabetes Pharmacotherapy:    Jardiance 25mg daily  Glimepiride 2mg BID- An hour before meal  Toujeo 38 units at bedtime  Adjusted down d/t lows    Adherence: denies non-adherence    Previously trialed meds:   Trulicity  Metformin for diabetes  Lantus for years  Trulicity had severe lows    Social:  Current diet: in general, a \"healthy\" diet    Breakfast " "-   Lunch -   Dinner -   Snack -   Fluids -   Current exercise: Limited, travels a lot      Allergies:  Lantus solostar u-100 insulin [insulin glargine]    Medication list:  Current Outpatient Medications   Medication Instructions    albuterol 90 mcg/actuation inhaler 2 puffs, inhalation, Every 4 hours PRN    aspirin 81 mg, Daily    atorvastatin (LIPITOR) 80 mg, oral, Daily    blood-glucose sensor (FreeStyle Franky 3 Plus Sensor) device For continuous glucose monitoring.  Whittaker every 15 days    buprenorphine-naloxone (Suboxone) 8-2 mg SL film 1.5 Film, Daily    carvedilol (COREG) 12.5 mg, oral, 2 times daily    cetirizine (ZyrTEC) 10 mg tablet TAKE 1 TABLET BY MOUTH EVERY DAY *BUY OTC    empagliflozin (JARDIANCE) 25 mg, oral, Daily    ezetimibe (ZETIA) 10 mg, oral, Daily    glimepiride (AMARYL) 2 mg, oral, 2 times daily    insulin glargine (TOUJEO MAX U-300 SOLOSTAR) 40 Units, subcutaneous, Nightly, Take as directed per insulin instructions.    isosorbide mononitrate ER (IMDUR) 30 mg, oral, Daily with evening meal, Do not crush or chew.    lancets (OneTouch UltraSoft Lancets) misc 1 strip, 3 times daily    lisinopril 40 mg, oral, Daily    Lokelma 10 gram packet Every 24 hours    naloxone (NARCAN) 4 mg    nicotine (Nicoderm CQ) 21 mg/24 hr patch 1 patch, transdermal, Every 24 hours    OneTouch Ultra Test strip 1 strip, Topical, 4 times daily    OneTouch Ultra Test strip 1 strip, miscellaneous, 3 times daily    pen needle, diabetic (BD Ultra-Fine Mini Pen Needle) 31 gauge x 3/16\" needle 1 each, topical (top), Daily, Use one daily with insulin pen    tiotropium-olodateroL (Stiolto Respimat) 2.5-2.5 mcg/actuation mist inhaler 2 Inhalations, inhalation, Daily    triamcinolone (Kenalog) 0.1 % ointment Topical, 2 times daily, APPLY SPARINGLY AND RUB IN WELL TO AFFECTED AREA TWICE DAILY        Objective   Last Recorded Vitals:  BP Readings from Last 3 Encounters:   06/13/25 125/75   06/03/25 128/72   05/14/25 160/76     Wt " "Readings from Last 3 Encounters:   06/03/25 105 kg (231 lb)   02/06/25 104 kg (229 lb)   02/03/25 98.9 kg (218 lb)     BMI Readings from Last 1 Encounters:   06/03/25 30.48 kg/m²      Labs  A1C  Lab Results   Component Value Date    HGBA1C 8.3 (H) 05/23/2025    HGBA1C 9.1 (H) 12/20/2024    HGBA1C 10.7 (H) 11/08/2024     BMP/LFTs  Lab Results   Component Value Date    CREATININE 2.22 (H) 06/19/2025    CREATININE 2.59 (H) 05/23/2025    CREATININE 2.03 (H) 12/17/2024    EGFR 33 (L) 06/19/2025    EGFR 27 (L) 05/23/2025    EGFR 37 (L) 12/17/2024    GLUCOSE 160 (H) 06/19/2025     06/19/2025    K 5.1 06/19/2025     06/19/2025    CALCIUM 8.2 (L) 06/19/2025    CO2 22 06/19/2025    BUN 48 (H) 06/19/2025    ALT 9 05/23/2025    AST 13 05/23/2025    ALKPHOS 123 05/23/2025    BILITOT 0.3 05/23/2025     Lipids  Lab Results   Component Value Date    TRIG 169 (H) 05/23/2025    CHOL 97 05/23/2025    LDLF 31 03/30/2023    LDLCALC 44 05/23/2025    HDL 28 (L) 05/23/2025     Urine Albumin Creatinine Ratio  Lab Results   Component Value Date    MICROALBCREA 29 05/23/2025    MICROALBCREA 157.5 (H) 11/08/2024    MICROALBCREA 116.1 (H) 08/02/2024     ASCVD risk  The ASCVD Risk score ([place] DK, et al., 2019) failed to calculate for the following reasons:    The valid total cholesterol range is 130 to 320 mg/dL    Additional labs:  No results found for: \"FRUCTOSAMINE\", \"CPEPTIDE\", \"MKB74VW\", \"NTIB\", \"ZNT8A\", \"INSAB\"    Home glucose monitoring:  Hypoglycemia: Continues to experience regular overnight lows. Some suspicious of compression lows. Some during the day        Assessment/Plan   Type 2 Diabetes Mellitus  Goal A1C <7%  Relatively well controlled T2DM on basal insulin, glimepiride and Jardiance. Experiencing PP hyperglycemia as well as regular hypoglycemia throughout the day and night. Discussed options today including reducing insulin dose and adjusting diet vs. Adding GLP1. Discussed risks of Ozempic including GI side " effects, dehydration, TERESA, pancreatitis. Discussed potential benefits of Ozempic including long-term reduction in renal function decline, weight loss, CV protection, elimination of ALVAREZ and reduction in insulin requirements. Through joint decision-making, decided that potential benefits of therapy outweigh risks. Will start Ozempic 0.25mg, eliminate glimepiride, and reduce insulin  Plan:  Start Ozempic 0.25mg weekly  Discontinue glimepiride 2mg BID  Decrease Toujeo 30 units daily  Continue Jardiance 25mg daily  Home glucose monitoring:   Will continue FSL3+  A minimum of 72 hours of CGM data was reviewed and used to make therapy changes.   Education Provided to Patient:   Ozempic Education:     - Counseled patient on Ozempic MOA, expectations, side effects, duration of therapy, administration, and monitoring parameters.  - Provided detailed dosing and administration counseling to ensure proper technique.   - Reviewed Ozempic titration schedule, starting with 0.25 mg once weekly for 4 weeks, then continuing on 0.5 mg once weekly. Pt verbalized understanding.  - Counseled patient on the benefits of GLP-1ra, such as cardiovascular risk reduction, glycemic control, and weight loss potential.  - Reviewed storage requirements of Ozempic when not in use, and when to administer the medication if a dose is missed.  - Advised patient that they may experience improved satiety after meals and portion sizes of meals may be reduced as doses of Ozempic increase.  - Counseled patient to avoid foods that are fatty/oily as this may precipitate the nausea/GI upset that may occur with new start Ozempic.    Hypertension:   Goal BP <130/80    Last 2/3 clinic readings at goal  Denies s/sx of hyper-/hypo-tension   Primary prevention:   Therapy: High intensity statin and Ezetimibe   LDL result meets goal   Renal:  CKD: stage 3 - GFR 30-59  ACR: <30 (5/2023)  Renal protective agents: ACEi/ARB, SGLT2i, and GLP1  DM medications are dosed  appropriately for renal function  Labs: up to date   PharmD follow-up: 3 weeks  Endo follow-up: 9/24/25    Patient agreeable to plan as above, contact information provided for any future questions or concerns.    Spring Roberts PharmD    Type of encounter: virtual  Provider on site:     Continue all meds under the continuation of care with the referring provider and clinical pharmacy team.           [1]   Patient Active Problem List  Diagnosis    Anemia    Chronic fatigue    Chronic neck pain    Fatty liver disease, nonalcoholic    Primary hypertension    Itch of skin    Myofascial pain    Nicotine dependence, cigarettes, uncomplicated    Opioid dependence    Positive occult stool blood test    Pure hypercholesterolemia    Ulnar tunnel syndrome of left wrist    Vitamin B12 deficiency    Encounter for immunization    Encounter for screening colonoscopy    Screening for colon cancer    Hyperparathyroidism due to renal insufficiency (Multi)    Rash of groin    Renal cyst    Nephrolithiasis    Benign prostatic hyperplasia without lower urinary tract symptoms    Type 2 diabetes mellitus, with long-term current use of insulin    Coronary artery calcification    History of coronary artery stent placement    Angina pectoris, unstable (Multi)

## 2025-06-23 DIAGNOSIS — Z79.4 TYPE 2 DIABETES MELLITUS WITH STAGE 3B CHRONIC KIDNEY DISEASE, WITH LONG-TERM CURRENT USE OF INSULIN (MULTI): ICD-10-CM

## 2025-06-23 DIAGNOSIS — N18.32 TYPE 2 DIABETES MELLITUS WITH STAGE 3B CHRONIC KIDNEY DISEASE, WITH LONG-TERM CURRENT USE OF INSULIN (MULTI): ICD-10-CM

## 2025-06-23 DIAGNOSIS — E11.22 TYPE 2 DIABETES MELLITUS WITH STAGE 3B CHRONIC KIDNEY DISEASE, WITH LONG-TERM CURRENT USE OF INSULIN (MULTI): ICD-10-CM

## 2025-06-23 RX ORDER — PEN NEEDLE, DIABETIC 30 GX3/16"
1 NEEDLE, DISPOSABLE MISCELLANEOUS NIGHTLY
Qty: 100 EACH | Refills: 3 | Status: SHIPPED | OUTPATIENT
Start: 2025-06-23 | End: 2026-06-23

## 2025-06-23 NOTE — TELEPHONE ENCOUNTER
Parth would like a refill on pen needles sent to [facility] #[id number]. Going out of town.    Next appointment 9/24/25

## 2025-07-14 ENCOUNTER — APPOINTMENT (OUTPATIENT)
Dept: ENDOCRINOLOGY | Facility: CLINIC | Age: 60
End: 2025-07-14
Payer: MEDICARE

## 2025-07-14 DIAGNOSIS — E11.22 TYPE 2 DIABETES MELLITUS WITH STAGE 4 CHRONIC KIDNEY DISEASE, WITH LONG-TERM CURRENT USE OF INSULIN (MULTI): ICD-10-CM

## 2025-07-14 DIAGNOSIS — N18.4 TYPE 2 DIABETES MELLITUS WITH STAGE 4 CHRONIC KIDNEY DISEASE, WITH LONG-TERM CURRENT USE OF INSULIN (MULTI): ICD-10-CM

## 2025-07-14 DIAGNOSIS — Z79.4 TYPE 2 DIABETES MELLITUS WITH STAGE 4 CHRONIC KIDNEY DISEASE, WITH LONG-TERM CURRENT USE OF INSULIN (MULTI): ICD-10-CM

## 2025-07-14 RX ORDER — SEMAGLUTIDE 0.68 MG/ML
0.5 INJECTION, SOLUTION SUBCUTANEOUS
Qty: 3 ML | Refills: 0 | Status: SHIPPED | OUTPATIENT
Start: 2025-07-14 | End: 2025-08-11

## 2025-07-14 NOTE — PROGRESS NOTES
"Patient is sent at the request of Stoney Yang* for my opinion regarding diabetes.  My recommendations below will be communicated back to the requesting provider by way of shared medical record.    Recommendations:   Increase Ozempic to 0.5mg weekly next week  Continue Toujeo 30 units daily, Jardiance 25mg daily  If glucose continues to spike during the day, restart glimepiride 2mg with first meal of the day.   Follow-up in 4 weeks via phone for Ozempic titration  ________________________________________________________________________    Subjective   Past Medical History:  Problem List[1]      HPI:  Parth Rodríguez is a 59 y.o. male with a PMH significant for coronary artery stent placement, fatty liver, T2DM, HTN, renal insufficiency   Pt presents today for new patient visit with endo PharmD for Type 2 Diabetes Mellitus  Last seen by Dr. Cruz Pearson MD on 6/3/25 where patient reported lows to the 40s overnight. Glimepiride was cut in half.    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 Used to be a , so knows how to prepare food  Referred to dietician before  Greens, peas, fish. Knows how to prepare and eat  Ozempic  No FH MENS2, thyroid cancer  Appetite good, no malnutrition  BM- hard stools. Drastically changed in last 6 weeks. Has been a lot better  No side effects reported in first 3 doses  Went 6 days without insulin- forgot it when he was travelling  Infection for the last 7 days. Hangnail.  On the road traveling. Home for 3 weeks. Trying to get back into his routine    Diabetes Pharmacotherapy:    Jardiance 25mg daily  Toujeo 30 units at bedtime  Ozempic 0.25mg 3 doses done  Due today.     Adherence: denies non-adherence    Previously trialed meds:   Trulicity  Metformin for diabetes  Lantus for years  Trulicity had severe lows    Social:  Current diet: in general, a \"healthy\" diet    Breakfast -   Lunch -   Dinner -   Snack -   Fluids -   Current " "exercise: Limited, travels a lot      Allergies:  Lantus solostar u-100 insulin [insulin glargine]    Medication list:  Current Outpatient Medications   Medication Instructions    albuterol 90 mcg/actuation inhaler 2 puffs, inhalation, Every 4 hours PRN    aspirin 81 mg, Daily    atorvastatin (LIPITOR) 80 mg, oral, Daily    blood-glucose sensor (FreeStyle Franky 3 Plus Sensor) device For continuous glucose monitoring.  Whittaker every 15 days    buprenorphine-naloxone (Suboxone) 8-2 mg SL film 1.5 Film, Daily    carvedilol (COREG) 12.5 mg, oral, 2 times daily    cetirizine (ZyrTEC) 10 mg tablet TAKE 1 TABLET BY MOUTH EVERY DAY *BUY OTC    empagliflozin (JARDIANCE) 25 mg, oral, Daily    ezetimibe (ZETIA) 10 mg, oral, Daily    insulin glargine (TOUJEO MAX U-300 SOLOSTAR) 40 Units, subcutaneous, Nightly, Take as directed per insulin instructions.    isosorbide mononitrate ER (IMDUR) 30 mg, oral, Daily with evening meal, Do not crush or chew.    lancets (OneTouch UltraSoft Lancets) misc 1 strip, 3 times daily    lisinopril 40 mg, oral, Daily    Lokelma 10 gram packet Every 24 hours    naloxone (NARCAN) 4 mg    nicotine (Nicoderm CQ) 21 mg/24 hr patch 1 patch, transdermal, Every 24 hours    OneTouch Ultra Test strip 1 strip, Topical, 4 times daily    OneTouch Ultra Test strip 1 strip, miscellaneous, 3 times daily    pen needle, diabetic (BD Ultra-Fine Mini Pen Needle) 31 gauge x 3/16\" needle 1 each, subcutaneous, Nightly    semaglutide (Ozempic) 0.25 mg or 0.5 mg (2 mg/3 mL) pen injector Inject 0.25 mg under the skin every 7 days for 28 days, THEN 0.5 mg every 7 days for 28 days.    tiotropium-olodateroL (Stiolto Respimat) 2.5-2.5 mcg/actuation mist inhaler 2 Inhalations, inhalation, Daily    triamcinolone (Kenalog) 0.1 % ointment Topical, 2 times daily, APPLY SPARINGLY AND RUB IN WELL TO AFFECTED AREA TWICE DAILY        Objective   Last Recorded Vitals:  BP Readings from Last 3 Encounters:   06/13/25 125/75   06/03/25 128/72 " "  05/14/25 160/76     Wt Readings from Last 3 Encounters:   06/03/25 105 kg (231 lb)   02/06/25 104 kg (229 lb)   02/03/25 98.9 kg (218 lb)     BMI Readings from Last 1 Encounters:   06/03/25 30.48 kg/m²      Labs  A1C  Lab Results   Component Value Date    HGBA1C 8.3 (H) 05/23/2025    HGBA1C 9.1 (H) 12/20/2024    HGBA1C 10.7 (H) 11/08/2024     BMP/LFTs  Lab Results   Component Value Date    CREATININE 2.22 (H) 06/19/2025    CREATININE 2.59 (H) 05/23/2025    CREATININE 2.03 (H) 12/17/2024    EGFR 33 (L) 06/19/2025    EGFR 27 (L) 05/23/2025    EGFR 37 (L) 12/17/2024    GLUCOSE 160 (H) 06/19/2025     06/19/2025    K 5.1 06/19/2025     06/19/2025    CALCIUM 8.2 (L) 06/19/2025    CO2 22 06/19/2025    BUN 48 (H) 06/19/2025    ALT 9 05/23/2025    AST 13 05/23/2025    ALKPHOS 123 05/23/2025    BILITOT 0.3 05/23/2025     Lipids  Lab Results   Component Value Date    TRIG 169 (H) 05/23/2025    CHOL 97 05/23/2025    LDLF 31 03/30/2023    LDLCALC 44 05/23/2025    HDL 28 (L) 05/23/2025     Urine Albumin Creatinine Ratio  Lab Results   Component Value Date    MICROALBCREA 29 05/23/2025    MICROALBCREA 157.5 (H) 11/08/2024    MICROALBCREA 116.1 (H) 08/02/2024     ASCVD risk  The ASCVD Risk score (Cass DK, et al., 2019) failed to calculate for the following reasons:    The valid total cholesterol range is 130 to 320 mg/dL    Additional labs:  No results found for: \"FRUCTOSAMINE\", \"CPEPTIDE\", \"QPC86GI\", \"NTIB\", \"ZNT8A\", \"INSAB\"    Home glucose monitoring:  Hypoglycemia: Continues to experience regular overnight lows. Some suspicious of compression lows. Some during the day        Assessment/Plan   Type 2 Diabetes Mellitus  Goal A1C <7%  Relatively well controlled T2DM on basal insulin, Ozempic and Jardiance. Experiencing PP hyperglycemia. Admits that he forgot insulin when he was travelling. Now home and back in his routine. Denies side effects from Ozempic. Increase to 0.5mg next week. If glucose spikes during the " day continue, may restart glimepiride 2mg with first meal of the day.  Plan:  Increase Ozempic to 0.5mg weekly next week  Continue Toujeo 30 units daily, Jardiance 25mg daily  If glucose continues to spike during the day, restart glimepiride 2mg with first meal of the day.   Home glucose monitoring:   Will continue FSL3+  A minimum of 72 hours of CGM data was reviewed and used to make therapy changes.   Education Provided to Patient:   Ozempic Education:     - Counseled patient on Ozempic MOA, expectations, side effects, duration of therapy, administration, and monitoring parameters.  - Provided detailed dosing and administration counseling to ensure proper technique.   - Reviewed Ozempic titration schedule, starting with 0.25 mg once weekly for 4 weeks, then continuing on 0.5 mg once weekly. Pt verbalized understanding.  - Counseled patient on the benefits of GLP-1ra, such as cardiovascular risk reduction, glycemic control, and weight loss potential.  - Reviewed storage requirements of Ozempic when not in use, and when to administer the medication if a dose is missed.  - Advised patient that they may experience improved satiety after meals and portion sizes of meals may be reduced as doses of Ozempic increase.  - Counseled patient to avoid foods that are fatty/oily as this may precipitate the nausea/GI upset that may occur with new start Ozempic.    Hypertension:   Goal BP <130/80    Last 2/3 clinic readings at goal  Denies s/sx of hyper-/hypo-tension   Primary prevention:   Therapy: High intensity statin and Ezetimibe   LDL result meets goal   Renal:  CKD: stage 3 - GFR 30-59  ACR: <30 (5/2023)  Renal protective agents: ACEi/ARB, SGLT2i, and GLP1  DM medications are dosed appropriately for renal function  Labs: up to date   PharmD follow-up: 8/8 3pm  Endo follow-up: 9/24/25    Patient agreeable to plan as above, contact information provided for any future questions or concerns.    Spring Roberts, JoeyD    Type of  encounter: virtual  Provider on site:     Continue all meds under the continuation of care with the referring provider and clinical pharmacy team.           [1]   Patient Active Problem List  Diagnosis    Anemia    Chronic fatigue    Chronic neck pain    Fatty liver disease, nonalcoholic    Primary hypertension    Itch of skin    Myofascial pain    Nicotine dependence, cigarettes, uncomplicated    Opioid dependence    Positive occult stool blood test    Pure hypercholesterolemia    Ulnar tunnel syndrome of left wrist    Vitamin B12 deficiency    Encounter for immunization    Encounter for screening colonoscopy    Screening for colon cancer    Hyperparathyroidism due to renal insufficiency (Multi)    Rash of groin    Renal cyst    Nephrolithiasis    Benign prostatic hyperplasia without lower urinary tract symptoms    Type 2 diabetes mellitus, with long-term current use of insulin    Coronary artery calcification    History of coronary artery stent placement    Angina pectoris, unstable (Multi)

## 2025-07-29 ENCOUNTER — TELEPHONE (OUTPATIENT)
Dept: ENDOCRINOLOGY | Facility: CLINIC | Age: 60
End: 2025-07-29

## 2025-07-29 RX ORDER — INSULIN GLARGINE 100 [IU]/ML
INJECTION, SOLUTION SUBCUTANEOUS EVERY 24 HOURS
COMMUNITY
End: 2025-07-29

## 2025-08-08 ENCOUNTER — APPOINTMENT (OUTPATIENT)
Dept: ENDOCRINOLOGY | Facility: CLINIC | Age: 60
End: 2025-08-08
Payer: COMMERCIAL

## 2025-08-08 DIAGNOSIS — E11.22 TYPE 2 DIABETES MELLITUS WITH STAGE 4 CHRONIC KIDNEY DISEASE, WITH LONG-TERM CURRENT USE OF INSULIN (MULTI): ICD-10-CM

## 2025-08-08 DIAGNOSIS — N18.4 TYPE 2 DIABETES MELLITUS WITH STAGE 4 CHRONIC KIDNEY DISEASE, WITH LONG-TERM CURRENT USE OF INSULIN (MULTI): ICD-10-CM

## 2025-08-08 DIAGNOSIS — Z79.4 TYPE 2 DIABETES MELLITUS WITH STAGE 4 CHRONIC KIDNEY DISEASE, WITH LONG-TERM CURRENT USE OF INSULIN (MULTI): ICD-10-CM

## 2025-08-08 NOTE — PROGRESS NOTES
"Patient is sent at the request of Stoney Yang* for my opinion regarding diabetes.  My recommendations below will be communicated back to the requesting provider by way of shared medical record.    Recommendations:   Increase Ozempic to 0.5mg weekly next week  Decrease Toujeo to 25 units  Continue Jardiance 25mg daily  Follow-up in 4 weeks via phone for Ozempic titration 9/5 10am.   ________________________________________________________________________    Subjective   Past Medical History:  Problem List[1]      HPI:  Parth Rodríguez is a 59 y.o. 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 Used to be a , so knows how to prepare food  Referred to dietician before  Greens, peas, fish. Knows how to prepare and eat  Ozempic  No FH MENS2, thyroid cancer  Appetite good, no malnutrition  BM- hard stools. Drastically changed in last 6 weeks. Has been a lot better  No side effects reported in first 3 doses  Tresiba has a month left of Toujeo  Did not increase Ozempic after last viset     Diabetes Pharmacotherapy:    Jardiance 25mg daily  Toujeo 25 units at bedtime  Ozempic 0.25mg weekly    Adherence: denies non-adherence    Previously trialed meds:   Trulicity  Metformin for diabetes  Lantus for years  Trulicity had severe lows    Social:  Current diet: in general, a \"healthy\" diet    Breakfast -   Lunch -   Dinner -   Snack -   Fluids -   Current exercise: Limited, travels a lot      Allergies:  Lantus solostar u-100 insulin [insulin glargine]    Medication list:  Current Outpatient Medications   Medication Instructions    albuterol 90 mcg/actuation inhaler 2 puffs, inhalation, Every 4 hours PRN    aspirin 81 mg, Daily    atorvastatin " "(LIPITOR) 80 mg, oral, Daily    blood-glucose sensor (FreeStyle Franky 3 Plus Sensor) device For continuous glucose monitoring.  Whittaker every 15 days    buprenorphine-naloxone (Suboxone) 8-2 mg SL film 1.5 Film, Daily    carvedilol (COREG) 12.5 mg, oral, 2 times daily    cetirizine (ZyrTEC) 10 mg tablet TAKE 1 TABLET BY MOUTH EVERY DAY *BUY OTC    empagliflozin (JARDIANCE) 25 mg, oral, Daily    ezetimibe (ZETIA) 10 mg, oral, Daily    insulin glargine (TOUJEO MAX U-300 SOLOSTAR) 40 Units, subcutaneous, Nightly, Take as directed per insulin instructions.    isosorbide mononitrate ER (IMDUR) 30 mg, oral, Daily with evening meal, Do not crush or chew.    lancets (OneTouch UltraSoft Lancets) misc 1 strip, 3 times daily    lisinopril 40 mg, oral, Daily    Lokelma 10 gram packet Every 24 hours    naloxone (NARCAN) 4 mg    nicotine (Nicoderm CQ) 21 mg/24 hr patch 1 patch, transdermal, Every 24 hours    OneTouch Ultra Test strip 1 strip, Topical, 4 times daily    OneTouch Ultra Test strip 1 strip, miscellaneous, 3 times daily    Ozempic 0.5 mg, subcutaneous, Every 7 days    pen needle, diabetic (BD Ultra-Fine Mini Pen Needle) 31 gauge x 3/16\" needle 1 each, subcutaneous, Nightly    tiotropium-olodateroL (Stiolto Respimat) 2.5-2.5 mcg/actuation mist inhaler 2 Inhalations, inhalation, Daily    triamcinolone (Kenalog) 0.1 % ointment Topical, 2 times daily, APPLY SPARINGLY AND RUB IN WELL TO AFFECTED AREA TWICE DAILY        Objective   Last Recorded Vitals:  BP Readings from Last 3 Encounters:   06/13/25 125/75   06/03/25 128/72   05/14/25 160/76     Wt Readings from Last 3 Encounters:   06/03/25 105 kg (231 lb)   02/06/25 104 kg (229 lb)   02/03/25 98.9 kg (218 lb)     BMI Readings from Last 1 Encounters:   06/03/25 30.48 kg/m²      Labs  A1C  Lab Results   Component Value Date    HGBA1C 8.3 (H) 05/23/2025    HGBA1C 9.1 (H) 12/20/2024    HGBA1C 10.7 (H) 11/08/2024     BMP/LFTs  Lab Results   Component Value Date    CREATININE " "2.22 (H) 06/19/2025    CREATININE 2.59 (H) 05/23/2025    CREATININE 2.03 (H) 12/17/2024    EGFR 33 (L) 06/19/2025    EGFR 27 (L) 05/23/2025    EGFR 37 (L) 12/17/2024    GLUCOSE 160 (H) 06/19/2025     06/19/2025    K 5.1 06/19/2025     06/19/2025    CALCIUM 8.2 (L) 06/19/2025    CO2 22 06/19/2025    BUN 48 (H) 06/19/2025    ALT 9 05/23/2025    AST 13 05/23/2025    ALKPHOS 123 05/23/2025    BILITOT 0.3 05/23/2025     Lipids  Lab Results   Component Value Date    TRIG 169 (H) 05/23/2025    CHOL 97 05/23/2025    LDLF 31 03/30/2023    LDLCALC 44 05/23/2025    HDL 28 (L) 05/23/2025     Urine Albumin Creatinine Ratio  Lab Results   Component Value Date    MICROALBCREA 29 05/23/2025    MICROALBCREA 157.5 (H) 11/08/2024    MICROALBCREA 116.1 (H) 08/02/2024     ASCVD risk  The ASCVD Risk score (Cass BAIRES, et al., 2019) failed to calculate for the following reasons:    The valid total cholesterol range is 130 to 320 mg/dL    Additional labs:  No results found for: \"FRUCTOSAMINE\", \"CPEPTIDE\", \"YQN78QG\", \"NTIB\", \"ZNT8A\", \"INSAB\"    Home glucose monitoring:  Hypoglycemia: Continues to experience regular overnight lows. Some suspicious of compression lows. Some during the day        Assessment/Plan   Type 2 Diabetes Mellitus  Goal A1C <7%  Relatively well controlled T2DM on basal insulin, Ozempic and Jardiance. Experiencing PP hyperglycemia. Admits that he forgot insulin when he was travelling. Now home and back in his routine. Denies side effects from Ozempic. Increase to 0.5mg next week. If glucose spikes during the day continue, may restart glimepiride 2mg with first meal of the day.  Plan:  Increase Ozempic to 0.5mg weekly next week  Decrease Toujeo 25  Continue Jardiance 25mg daily  Home glucose monitoring:   Will continue FSL3+  A minimum of 72 hours of CGM data was reviewed and used to make therapy changes.   Education Provided to Patient:   Ozempic Education:     - Counseled patient on Ozempic MOA, " expectations, side effects, duration of therapy, administration, and monitoring parameters.  - Provided detailed dosing and administration counseling to ensure proper technique.   - Reviewed Ozempic titration schedule, starting with 0.25 mg once weekly for 4 weeks, then continuing on 0.5 mg once weekly. Pt verbalized understanding.  - Counseled patient on the benefits of GLP-1ra, such as cardiovascular risk reduction, glycemic control, and weight loss potential.  - Reviewed storage requirements of Ozempic when not in use, and when to administer the medication if a dose is missed.  - Advised patient that they may experience improved satiety after meals and portion sizes of meals may be reduced as doses of Ozempic increase.  - Counseled patient to avoid foods that are fatty/oily as this may precipitate the nausea/GI upset that may occur with new start Ozempic.    Hypertension:   Goal BP <130/80    Last 2/3 clinic readings at goal  Denies s/sx of hyper-/hypo-tension   Primary prevention:   Therapy: High intensity statin and Ezetimibe   LDL result meets goal   Renal:  CKD: stage 3 - GFR 30-59  ACR: <30 (5/2023)  Renal protective agents: ACEi/ARB, SGLT2i, and GLP1  DM medications are dosed appropriately for renal function  Labs: up to date   PharmD follow-up: 9/5 10am  Endo follow-up: 9/24/25    Patient agreeable to plan as above, contact information provided for any future questions or concerns.    Spring Roberts, Sandra    Type of encounter: virtual  Provider on site:     Continue all meds under the continuation of care with the referring provider and clinical pharmacy team.           [1]   Patient Active Problem List  Diagnosis    Anemia    Chronic fatigue    Chronic neck pain    Fatty liver disease, nonalcoholic    Primary hypertension    Itch of skin    Myofascial pain    Nicotine dependence, cigarettes, uncomplicated    Opioid dependence    Positive occult stool blood test    Pure hypercholesterolemia    Ulnar  tunnel syndrome of left wrist    Vitamin B12 deficiency    Encounter for immunization    Encounter for screening colonoscopy    Screening for colon cancer    Hyperparathyroidism due to renal insufficiency (Multi)    Rash of groin    Renal cyst    Nephrolithiasis    Benign prostatic hyperplasia without lower urinary tract symptoms    Type 2 diabetes mellitus, with long-term current use of insulin    Coronary artery calcification    History of coronary artery stent placement    Angina pectoris, unstable (Multi)

## 2025-09-05 ENCOUNTER — APPOINTMENT (OUTPATIENT)
Dept: ENDOCRINOLOGY | Facility: CLINIC | Age: 60
End: 2025-09-05
Payer: COMMERCIAL

## 2025-09-24 ENCOUNTER — APPOINTMENT (OUTPATIENT)
Dept: ENDOCRINOLOGY | Facility: CLINIC | Age: 60
End: 2025-09-24
Payer: MEDICARE

## 2025-10-29 ENCOUNTER — APPOINTMENT (OUTPATIENT)
Dept: PHARMACY | Facility: HOSPITAL | Age: 60
End: 2025-10-29
Payer: COMMERCIAL

## 2025-11-19 ENCOUNTER — APPOINTMENT (OUTPATIENT)
Dept: PRIMARY CARE | Facility: CLINIC | Age: 60
End: 2025-11-19
Payer: MEDICARE

## (undated) DEVICE — INTRODUCER, GLIDESHEATH SLENDER A-KIT, 6FR 10CM

## (undated) DEVICE — CATHETER, ANGIO, IMPULSE, FL3.5, 5 FR X 100 CM

## (undated) DEVICE — GUIDEWIRE, INQWIRE, 3MM J, .035 X 210CM, FIXED

## (undated) DEVICE — CATHETER, ANGIO, IMPULSE, FR4, 5 FR X 100 CM

## (undated) DEVICE — TR BAND, RADIAL COMPRESSION, STANDARD, 24CM